# Patient Record
Sex: FEMALE | Race: WHITE | NOT HISPANIC OR LATINO | Employment: FULL TIME | URBAN - METROPOLITAN AREA
[De-identification: names, ages, dates, MRNs, and addresses within clinical notes are randomized per-mention and may not be internally consistent; named-entity substitution may affect disease eponyms.]

---
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
## 2020-02-02 DIAGNOSIS — N94.6 DYSMENORRHEA: ICD-10-CM

## 2020-02-02 DIAGNOSIS — L70.0 ACNE VULGARIS: ICD-10-CM

## 2020-02-03 RX ORDER — NORGESTIMATE AND ETHINYL ESTRADIOL 7DAYSX3 28
KIT ORAL
Qty: 84 TABLET | Refills: 0 | Status: SHIPPED | OUTPATIENT
Start: 2020-02-03 | End: 2020-04-21

## 2020-02-04 NOTE — TELEPHONE ENCOUNTER
Pending Prescriptions:                       Disp   Refills    TRI FEMYNOR 0.18/0.215/0.25 MG-35 MCG tab*84 tab*0            Sig: TAKE 1 TABLET BY MOUTH EVERY DAY    Medication is being filled for 1 time refill only due to:  Patient needs to be seen because it has been more than one year since last visit.     Krystin Arroyo, LOLAN, RN, PHN

## 2020-02-04 NOTE — TELEPHONE ENCOUNTER
Left message for patient to return call to clinic. When call is returned please assist in scheduling an appointment. See message below.    Edu Alex,

## 2020-02-05 NOTE — TELEPHONE ENCOUNTER
Left message for pt to return call, when call is returned give information below and help schedule visit.      Tricia Smith CMA ([facility])

## 2020-02-07 NOTE — TELEPHONE ENCOUNTER
No answer and unable to leave a message, voicemail is full. Pharmacy notified one time fill only patient is due for an office visit

## 2020-04-09 ENCOUNTER — MYC MEDICAL ADVICE (OUTPATIENT)
Dept: FAMILY MEDICINE | Facility: OTHER | Age: 20
End: 2020-04-09

## 2020-04-10 NOTE — TELEPHONE ENCOUNTER
MyChart message has been read, appt has not been read  Closing encounter  Gabrielle George RT (R)

## 2020-04-21 ENCOUNTER — VIRTUAL VISIT (OUTPATIENT)
Dept: FAMILY MEDICINE | Facility: OTHER | Age: 20
End: 2020-04-21
Payer: COMMERCIAL

## 2020-04-21 DIAGNOSIS — N94.6 DYSMENORRHEA: ICD-10-CM

## 2020-04-21 DIAGNOSIS — L70.0 ACNE VULGARIS: ICD-10-CM

## 2020-04-21 DIAGNOSIS — J02.9 SORE THROAT: Primary | ICD-10-CM

## 2020-04-21 PROCEDURE — 99214 OFFICE O/P EST MOD 30 MIN: CPT | Mod: GT | Performed by: PHYSICIAN ASSISTANT

## 2020-04-21 RX ORDER — PENICILLIN V POTASSIUM 500 MG/1
500 TABLET, FILM COATED ORAL 2 TIMES DAILY
Qty: 20 TABLET | Refills: 0 | Status: SHIPPED | OUTPATIENT
Start: 2020-04-21 | End: 2020-08-13

## 2020-04-21 RX ORDER — NORGESTIMATE AND ETHINYL ESTRADIOL 7DAYSX3 28
1 KIT ORAL DAILY
Qty: 84 TABLET | Refills: 3 | Status: SHIPPED | OUTPATIENT
Start: 2020-04-21 | End: 2021-04-01

## 2020-04-21 NOTE — PROGRESS NOTES
"Deepika Wheeler is a 19 year old female who is being evaluated via a billable video visit.      The patient has been notified of following:     \"This video visit will be conducted via a call between you and your physician/provider. We have found that certain health care needs can be provided without the need for an in-person physical exam.  This service lets us provide the care you need with a video conversation.  If a prescription is necessary we can send it directly to your pharmacy.  If lab work is needed we can place an order for that and you can then stop by our lab to have the test done at a later time.    Video visits are billed at different rates depending on your insurance coverage.  Please reach out to your insurance provider with any questions.    If during the course of the call the physician/provider feels a video visit is not appropriate, you will not be charged for this service.\"    Patient has given verbal consent for Video visit? Yes    How would you like to obtain your AVS? Edi    Patient would like the video invitation sent by: Send to e-mail at: [email or web address] [phone]    Will anyone else be joining your video visit? No      Subjective     Deepika Wheeler is a 19 year old female who presents to clinic today for the following health issues:    HPI  Acute Illness   Acute illness concerns: Throat pain  Onset: 2-3 days    Fever: no    Chills/Sweats: no    Headache (location?): YES    Sinus Pressure:YES- little    Conjunctivitis:  no    Ear Pain: no    Rhinorrhea: YES- little    Congestion: no    Sore Throat: YES     Cough: no    Wheeze: no    Decreased Appetite: no    Nausea: no    Vomiting: no    Diarrhea:  no    Dysuria/Freq.: no    Fatigue/Achiness: YES- fatigue    Sick/Strep Exposure: YES- brother had strep and she was around him.  Her brother had strep last week and she saw him.   Pt was treated for strep on 3-3-2020 with amox, did feel better after prescription  Was completed    "   Therapies Tried and outcome: Ibuprofen         Video Start Time: 13:36        Current Outpatient Medications   Medication Sig Dispense Refill     albuterol (PROAIR HFA) 108 (90 BASE) MCG/ACT inhaler Inhale 1-2 puffs into the lungs every 4 hours as needed for shortness of breath / dyspnea. 2 Inhaler prn     clindamycin (CLEOCIN T) 1 % external lotion APPLY TOPICALLY TWO TIMES A DAY 60 mL 11     IBUPROFEN PO Reported on 4/24/2017       loratadine (CLARITIN) 10 MG tablet Take 10 mg by mouth daily       norgestim-eth estrad triphasic (TRI FEMYNOR) 0.18/0.215/0.25 MG-35 MCG tablet Take 1 tablet by mouth daily 84 tablet 3     penicillin V (VEETID) 500 MG tablet Take 1 tablet (500 mg) by mouth 2 times daily 20 tablet 0     SUMAtriptan (IMITREX) 50 MG tablet TAKE ONE TABLET BY MOUTH AT ONSET OF HEADACHE FOR MIGRAINE. MAY REPEAT IN 2 HOURS IF NEEDED. MAXIMUM OF 2 TABLETS PER DAY 9 tablet 0     triamcinolone (NASACORT) 55 MCG/ACT nasal inhaler Spray 2 sprays into both nostrils 2 times daily Reported on 4/24/2017       BP Readings from Last 3 Encounters:   01/07/19 98/60   10/09/17 104/60   04/24/17 102/62 (17 %/ 26 %)*     *BP percentiles are based on the 2017 AAP Clinical Practice Guideline for girls    Wt Readings from Last 3 Encounters:   01/07/19 73.5 kg (162 lb) (90 %)*   10/09/17 68.6 kg (151 lb 3.2 oz) (86 %)*   05/18/17 71.1 kg (156 lb 11.2 oz) (90 %)*     * Growth percentiles are based on CDC (Girls, 2-20 Years) data.                    Reviewed and updated as needed this visit by Provider         Review of Systems   ROS COMP: CONSTITUTIONAL: NEGATIVE for fever, chills, change in weight  ENT/MOUTH: as above  RESP: NEGATIVE for significant cough or SOB  CV: NEGATIVE for chest pain, palpitations or peripheral edema  : she will need her bcp refilled too, doing well, no side effects or issues with this medication      Objective    There were no vitals taken for this visit.  Estimated body mass index is 24.64 kg/m  as  "calculated from the following:    Height as of 1/7/19: 1.727 m (5' 7.99\").    Weight as of 1/7/19: 73.5 kg (162 lb).  Physical Exam     GENERAL: healthy, alert and no distress  EYES: Eyes grossly normal to inspection, conjunctivae and sclerae normal  HENT: tonsillar hypertrophy and tonsillar erythema  No visible exudate   NECK: no asymmetry, masses or scars  RESP: no audible wheeze, cough, or visible cyanosis.  No visible retractions or increased work of breathing.  Able to speak fully in complete sentences.  SKIN: no suspicious lesions or rashes on visible skin  NEURO: Cranial nerves grossly intact, mentation intact and speech normal  PSYCH: mentation appears normal, affect normal/bright, judgement and insight intact, normal speech and appearance well-groomed      Diagnostic Test Results:  Labs reviewed in Epic  none         Assessment & Plan     1. Acne vulgaris  Pt requested refill, doing well PE in clinic as soon as we are able  - norgestim-eth estrad triphasic (TRI FEMYNOR) 0.18/0.215/0.25 MG-35 MCG tablet; Take 1 tablet by mouth daily  Dispense: 84 tablet; Refill: 3    2. Dysmenorrhea  Well controlled as above  - norgestim-eth estrad triphasic (TRI FEMYNOR) 0.18/0.215/0.25 MG-35 MCG tablet; Take 1 tablet by mouth daily  Dispense: 84 tablet; Refill: 3    3. Sore throat  Presumed strep vs virus, she will observe her symptoms for a few more days, if worsening and feels like strep she has had in past, ok to fill rx  - penicillin V (VEETID) 500 MG tablet; Take 1 tablet (500 mg) by mouth 2 times daily  Dispense: 20 tablet; Refill: 0           Return in about 3 months (around 7/21/2020) for Physical Exam.    Lena Madrid PA-C  [facility]      Video-Visit Details    Type of service:  Video Visit    Video End Time (time video stopped): 13:43    Originating Location (pt. Location): Home    Distant Location (provider location):  [facility]     Mode of Communication:  Video Conference " via doximity     Return in about 3 months (around 7/21/2020) for Physical Exam.       Lena Madrid PA-C

## 2020-05-19 ENCOUNTER — MYC MEDICAL ADVICE (OUTPATIENT)
Dept: FAMILY MEDICINE | Facility: OTHER | Age: 20
End: 2020-05-19

## 2020-05-19 DIAGNOSIS — Z11.59 SCREENING FOR VIRAL DISEASE: Primary | ICD-10-CM

## 2020-05-20 DIAGNOSIS — Z11.59 SCREENING FOR VIRAL DISEASE: ICD-10-CM

## 2020-05-20 PROCEDURE — 36415 COLL VENOUS BLD VENIPUNCTURE: CPT | Performed by: PHYSICIAN ASSISTANT

## 2020-05-20 PROCEDURE — 86769 SARS-COV-2 COVID-19 ANTIBODY: CPT | Mod: 90 | Performed by: PHYSICIAN ASSISTANT

## 2020-05-20 PROCEDURE — 99000 SPECIMEN HANDLING OFFICE-LAB: CPT | Performed by: PHYSICIAN ASSISTANT

## 2020-05-22 LAB
COVID-19 SPIKE RBD ABY TITER: NORMAL
COVID-19 SPIKE RBD ABY: NEGATIVE

## 2020-07-16 ENCOUNTER — MYC MEDICAL ADVICE (OUTPATIENT)
Dept: FAMILY MEDICINE | Facility: OTHER | Age: 20
End: 2020-07-16

## 2020-07-16 DIAGNOSIS — Z01.84 IMMUNITY STATUS TESTING: Primary | ICD-10-CM

## 2020-07-20 ENCOUNTER — ALLIED HEALTH/NURSE VISIT (OUTPATIENT)
Dept: FAMILY MEDICINE | Facility: CLINIC | Age: 20
End: 2020-07-20
Payer: COMMERCIAL

## 2020-07-20 DIAGNOSIS — Z01.84 IMMUNITY STATUS TESTING: ICD-10-CM

## 2020-07-20 DIAGNOSIS — Z11.1 TUBERCULOSIS SCREENING: Primary | ICD-10-CM

## 2020-07-20 DIAGNOSIS — Z23 NEED FOR VACCINATION: ICD-10-CM

## 2020-07-20 PROCEDURE — 99207 ZZC NO CHARGE NURSE ONLY: CPT

## 2020-07-20 PROCEDURE — 90471 IMMUNIZATION ADMIN: CPT

## 2020-07-20 PROCEDURE — 86580 TB INTRADERMAL TEST: CPT

## 2020-07-20 PROCEDURE — 86735 MUMPS ANTIBODY: CPT | Performed by: PHYSICIAN ASSISTANT

## 2020-07-20 PROCEDURE — 86765 RUBEOLA ANTIBODY: CPT | Performed by: PHYSICIAN ASSISTANT

## 2020-07-20 PROCEDURE — 86762 RUBELLA ANTIBODY: CPT | Performed by: PHYSICIAN ASSISTANT

## 2020-07-20 PROCEDURE — 36415 COLL VENOUS BLD VENIPUNCTURE: CPT | Performed by: PHYSICIAN ASSISTANT

## 2020-07-20 PROCEDURE — 90715 TDAP VACCINE 7 YRS/> IM: CPT

## 2020-07-20 NOTE — NURSING NOTE
Prior to immunization administration, verified patients identity using patient s name and date of birth. Please see Immunization Activity for additional information.     Screening Questionnaire for Adult Immunization    Are you sick today?   No   Do you have allergies to medications, food, a vaccine component or latex?   No   Have you ever had a serious reaction after receiving a vaccination?   No   Do you have a long-term health problem with heart, lung, kidney, or metabolic disease (e.g., diabetes), asthma, a blood disorder, no spleen, complement component deficiency, a cochlear implant, or a spinal fluid leak?  Are you on long-term aspirin therapy?   No   Do you have cancer, leukemia, HIV/AIDS, or any other immune system problem?   No   Do you have a parent, brother, or sister with an immune system problem?   No   In the past 3 months, have you taken medications that affect  your immune system, such as prednisone, other steroids, or anticancer drugs; drugs for the treatment of rheumatoid arthritis, Crohn s disease, or psoriasis; or have you had radiation treatments?   No   Have you had a seizure, or a brain or other nervous system problem?   No   During the past year, have you received a transfusion of blood or blood    products, or been given immune (gamma) globulin or antiviral drug?   No   For women: Are you pregnant or is there a chance you could become       pregnant during the next month?   No   Have you received any vaccinations in the past 4 weeks?   No     Immunization questionnaire answers were all negative.        . Patient instructed to remain in clinic for 15 minutes afterwards, and to report any adverse reaction to me immediately.       Screening performed by Courtney Melchor on 7/20/2020 at 9:12 AM      Patient was instructed to come back in 48-72 hours to get Mantoux checked.    The patient is asked the following questions today and these are her answers:    -Have you had a mantoux administered in  the past 30 days?    No  -Have you had a previous positive Mantoux.  No  -Have you received BCG in the past.  No  -Have you had a live vaccine  (MMR, Varicella, OPV, Yellow Fever) in the last 6 weeks.  No  -Have you had and active  viral or bacterial infection in the past 6 weeks.  No  -Have you received corticosteroids or immunosuppressive agents in the past 6 weeks.  No  -Have you been diagnosed with HIV?  No  -Do you have a malignancy?  No     All were answered on the Immunization questions as well.     Mantoux Questionnaire: answers were all negative.

## 2020-07-21 LAB
MEV IGG SER QL IA: 3.8 AI (ref 0–0.8)
MUV IGG SER QL IA: 3.3 AI (ref 0–0.8)
RUBV IGG SERPL IA-ACNC: 10 IU/ML

## 2020-07-22 ENCOUNTER — ALLIED HEALTH/NURSE VISIT (OUTPATIENT)
Dept: FAMILY MEDICINE | Facility: CLINIC | Age: 20
End: 2020-07-22
Payer: COMMERCIAL

## 2020-07-22 DIAGNOSIS — Z11.1 TUBERCULIN SKIN TEST ENCOUNTER: Primary | ICD-10-CM

## 2020-07-22 LAB
PPDINDURATION: 0 MM (ref 0–5)
PPDREDNESS: 0 MM

## 2020-07-22 PROCEDURE — 99207 ZZC NO CHARGE NURSE ONLY: CPT

## 2020-07-22 NOTE — PROGRESS NOTES
Mantoux result:  Lab Results   Component Value Date    PPDREDNESS 0 07/22/2020    PPDINDURATIO 0 07/22/2020     Is induration greater than 5mm?  No     Malissa Bella, LOLAN, RN

## 2020-08-06 ENCOUNTER — MYC MEDICAL ADVICE (OUTPATIENT)
Dept: FAMILY MEDICINE | Facility: OTHER | Age: 20
End: 2020-08-06

## 2020-08-12 NOTE — PROGRESS NOTES
SUBJECTIVE:   CC: Deepika Wheeler is an 20 year old woman who presents for preventive health visit.     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  She has already had her titers done.  Answers for HPI/ROS submitted by the patient on 8/13/2020   Annual Exam:  If you checked off any problems, how difficult have these problems made it for you to do your work, take care of things at home, or get along with other people?: Very difficult  PHQ9 TOTAL SCORE: 11      Today's PHQ-2 Score:   PHQ-2 ( 1999 Pf[employer]) 8/13/2020   Q1: Little interest or pleasure in doing things 1   Q2: Feeling down, depressed or hopeless 2   PHQ-2 Score 3   Q1: Little interest or pleasure in doing things Several days   Q2: Feeling down, depressed or hopeless More than half the days   PHQ-2 Score 3       Abuse: Current or Past(Physical, Sexual or Emotional)- No  Do you feel safe in your environment? Yes        Social History     Tobacco Use     Smoking status: Passive Smoke Exposure - Never Smoker     Smokeless tobacco: Never Used     Tobacco comment: no exposure   Substance Use Topics     Alcohol use: No     Alcohol/week: 0.0 standard drinks     If you drink alcohol do you typically have >3 drinks per day or >7 drinks per week? No    Alcohol Use 8/13/2020   Prescreen: >3 drinks/day or >7 drinks/week? No   Prescreen: >3 drinks/day or >7 drinks/week? -   No flowsheet data found.    Reviewed orders with patient.  Reviewed health maintenance and  updated orders accordingly - Yes  Labs reviewed in EPIC  BP Readings from Last 3 Encounters:   08/13/20 116/68   01/07/19 98/60   10/09/17 104/60    Wt Readings from Last 3 Encounters:   08/13/20 73.5 kg (162 lb)   01/07/19 73.5 kg (162 lb) (90 %, Z= 1.28)*   10/09/17 68.6 kg (151 lb 3.2 oz) (86 %, Z= 1.09)*     * Growth percentiles are based on [facility] (Girls, 2-20 Years) data.                  Patient Active Problem List   Diagnosis     Sinusitis, chronic     Allergic rhinitis     Headache     Acne     Adjustment disorder with mixed anxiety and depressed mood     Dysmenorrhea     CHEMO (generalized anxiety disorder)     Mild single current episode of major depressive disorder (H)     Acne vulgaris     Past Surgical History:   Procedure Laterality Date     HC CREATE EARDRUM OPENING,GEN ANESTH  2001    P.E. Tubes       Social History     Tobacco Use     Smoking status: Passive Smoke Exposure - Never Smoker     Smokeless tobacco: Never Used     Tobacco comment: no exposure   Substance Use Topics     Alcohol use: No     Alcohol/week: 0.0 standard drinks     Family History   Problem Relation Age of Onset     Alcohol/Drug Maternal Grandfather         recovering alcoholic     Blood Disease Maternal Grandfather         Hodgkins     Cancer Maternal Grandfather         non-hodgkins lymphoma     Heart Disease Maternal Grandfather         arrythmias     Hypertension Maternal Grandfather      Lipids Maternal Grandfather      Coronary Artery Disease Maternal Grandfather      Other Cancer Maternal Grandfather         Hodgkins     Hyperlipidemia Maternal Grandfather      Depression Maternal Grandfather      Substance Abuse Maternal Grandfather      Anesthesia Reaction Maternal Grandmother      Cancer Maternal Grandmother         Skin     Blood Disease Maternal Grandmother         Non-Hodgkins     Hypertension Maternal Grandmother      Lipids Maternal Grandmother      Obesity Maternal Grandmother      Cerebrovascular Disease Maternal  Grandmother      Other Cancer Maternal Grandmother         non hodgkins     Coronary Artery Disease Maternal Grandmother      Hyperlipidemia Maternal Grandmother      Depression Maternal Grandmother      Anxiety Disorder Maternal Grandmother      Heart Disease Brother         PDA & ASD     Congenital Anomalies Brother         Downs     Genetic Disorder Brother         down syndrome      () Other      Coronary Artery Disease Paternal Grandmother      Hypertension Paternal Grandmother      Osteoporosis Paternal Grandmother      Hyperlipidemia Paternal Grandmother      Coronary Artery Disease Paternal Grandfather      Hypertension Paternal Grandfather      Hyperlipidemia Paternal Grandfather      Substance Abuse Paternal Grandfather      Hypertension Mother      Depression Mother      Asthma Mother      Depression Father      Anxiety Disorder Father      Obesity Brother      Anxiety Disorder Brother          Current Outpatient Medications   Medication Sig Dispense Refill     albuterol (PROAIR HFA) 108 (90 BASE) MCG/ACT inhaler Inhale 1-2 puffs into the lungs every 4 hours as needed for shortness of breath / dyspnea. 2 Inhaler prn     clindamycin (CLEOCIN T) 1 % external lotion APPLY TOPICALLY TWO TIMES A DAY 60 mL 11     clindamycin (CLINDAMAX) 1 % external gel Apply topically 2 times daily 60 g 11     IBUPROFEN PO Reported on 4/24/2017       loratadine (CLARITIN) 10 MG tablet Take 10 mg by mouth daily       norgestim-eth estrad triphasic (TRI FEMYNOR) 0.18/0.215/0.25 MG-35 MCG tablet Take 1 tablet by mouth daily 84 tablet 3     SUMAtriptan (IMITREX) 50 MG tablet TAKE ONE TABLET BY MOUTH AT ONSET OF HEADACHE FOR MIGRAINE. MAY REPEAT IN 2 HOURS IF NEEDED. MAXIMUM OF 2 TABLETS PER DAY 9 tablet 0     triamcinolone (NASACORT) 55 MCG/ACT nasal inhaler Spray 2 sprays into both nostrils 2 times daily Reported on 4/24/2017       Allergies   Allergen Reactions     Blueberry Flavor Hives     Griseofulvin      rash      "Penicillins      augmentin     Quinolones      floxin  rash       Mammogram not appropriate for this patient based on age.    Pertinent mammograms are reviewed under the imaging tab.  History of abnormal Pap smear: NO - under age 21, PAP not appropriate for age     Reviewed and updated as needed this visit by clinical staff  Tobacco  Allergies  Meds  Med Hx  Surg Hx  Fam Hx  Soc Hx        Reviewed and updated as needed this visit by Provider            Review of Systems   Constitutional: Negative for chills and fever.   HENT: Negative for congestion, ear pain, hearing loss and sore throat.    Eyes: Negative for pain and visual disturbance.   Respiratory: Negative for cough and shortness of breath.    Cardiovascular: Negative for chest pain, palpitations and peripheral edema.   Gastrointestinal: Negative for abdominal pain, constipation, diarrhea, heartburn, hematochezia and nausea.   Breasts:  Negative for tenderness, breast mass and discharge.   Genitourinary: Negative for dysuria, frequency, genital sores, hematuria, urgency, vaginal bleeding and vaginal discharge.   Musculoskeletal: Negative for arthralgias, joint swelling and myalgias.   Skin: Negative for rash.   Neurological: Negative for dizziness, headaches and paresthesias.   Psychiatric/Behavioral: Positive for mood changes. The patient is nervous/anxious.    All other systems reviewed and are negative.    Anxiety she is managing it on her own and doing fine with it.       OBJECTIVE:   /68 (BP Location: Right arm, Patient Position: Chair, Cuff Size: Adult Regular)   Pulse 98   Temp 97.8  F (36.6  C) (Temporal)   Resp 16   Ht 1.744 m (5' 8.66\")   Wt 73.5 kg (162 lb)   LMP 08/05/2020   SpO2 100%   BMI 24.16 kg/m    Physical Exam  GENERAL: healthy, alert and no distress  EYES: Eyes grossly normal to inspection, PERRL and conjunctivae and sclerae normal  HENT: ear canals and TM's normal, nose and mouth without ulcers or lesions  NECK: no " "adenopathy, no asymmetry, masses, or scars and thyroid normal to palpation  RESP: lungs clear to auscultation - no rales, rhonchi or wheezes  BREAST: normal without masses, tenderness or nipple discharge and no palpable axillary masses or adenopathy  CV: regular rate and rhythm, normal S1 S2, no S3 or S4, no murmur, click or rub, no peripheral edema and peripheral pulses strong  ABDOMEN: soft, nontender, no hepatosplenomegaly, no masses and bowel sounds normal  MS: no gross musculoskeletal defects noted, no edema  SKIN: no suspicious lesions or rashes  NEURO: Normal strength and tone, mentation intact and speech normal  PSYCH: mentation appears normal, affect normal/bright    Diagnostic Test Results:  Labs reviewed in Epic  none     ASSESSMENT/PLAN:   1. Routine general medical examination at a health care facility  Healthy female    2. Acne vulgaris  Requests gel instead of lotion  - clindamycin (CLINDAMAX) 1 % external gel; Apply topically 2 times daily  Dispense: 60 g; Refill: 11    3. Screening examination for pulmonary tuberculosis  pendning  - Quantiferon TB Gold Plus    COUNSELING:  Reviewed preventive health counseling, as reflected in patient instructions    Estimated body mass index is 24.16 kg/m  as calculated from the following:    Height as of this encounter: 1.744 m (5' 8.66\").    Weight as of this encounter: 73.5 kg (162 lb).         reports that she is a non-smoker but has been exposed to tobacco smoke. She has never used smokeless tobacco.      Counseling Resources:  ATP IV Guidelines  Pooled Cohorts Equation Calculator  Breast Cancer Risk Calculator  FRAX Risk Assessment  ICSI Preventive Guidelines  Dietary Guidelines for Americans, 2010  USDA's MyPlate  ASA Prophylaxis  Lung CA Screening    Lena Madrid PA-C  [facility]  "

## 2020-08-13 ENCOUNTER — OFFICE VISIT (OUTPATIENT)
Dept: FAMILY MEDICINE | Facility: OTHER | Age: 20
End: 2020-08-13
Payer: COMMERCIAL

## 2020-08-13 VITALS
TEMPERATURE: 97.8 F | HEIGHT: 69 IN | OXYGEN SATURATION: 100 % | WEIGHT: 162 LBS | SYSTOLIC BLOOD PRESSURE: 116 MMHG | BODY MASS INDEX: 23.99 KG/M2 | DIASTOLIC BLOOD PRESSURE: 68 MMHG | HEART RATE: 98 BPM | RESPIRATION RATE: 16 BRPM

## 2020-08-13 DIAGNOSIS — Z00.00 ROUTINE GENERAL MEDICAL EXAMINATION AT A HEALTH CARE FACILITY: Primary | ICD-10-CM

## 2020-08-13 DIAGNOSIS — Z11.1 SCREENING EXAMINATION FOR PULMONARY TUBERCULOSIS: ICD-10-CM

## 2020-08-13 DIAGNOSIS — L70.0 ACNE VULGARIS: ICD-10-CM

## 2020-08-13 PROCEDURE — 36415 COLL VENOUS BLD VENIPUNCTURE: CPT | Performed by: PHYSICIAN ASSISTANT

## 2020-08-13 PROCEDURE — 99395 PREV VISIT EST AGE 18-39: CPT | Performed by: PHYSICIAN ASSISTANT

## 2020-08-13 PROCEDURE — 86481 TB AG RESPONSE T-CELL SUSP: CPT | Performed by: PHYSICIAN ASSISTANT

## 2020-08-13 RX ORDER — CLINDAMYCIN PHOSPHATE 10 MG/G
GEL TOPICAL 2 TIMES DAILY
Qty: 60 G | Refills: 11 | Status: SHIPPED | OUTPATIENT
Start: 2020-08-13 | End: 2021-09-14

## 2020-08-13 ASSESSMENT — MIFFLIN-ST. JEOR: SCORE: 1563.82

## 2020-08-13 ASSESSMENT — ENCOUNTER SYMPTOMS
JOINT SWELLING: 0
FREQUENCY: 0
DIZZINESS: 0
SORE THROAT: 0
COUGH: 0
CHILLS: 0
PARESTHESIAS: 0
DIARRHEA: 0
FEVER: 0
SHORTNESS OF BREATH: 0
CONSTIPATION: 0
NAUSEA: 0
ABDOMINAL PAIN: 0
NERVOUS/ANXIOUS: 1
HEADACHES: 0
MYALGIAS: 0
HEMATOCHEZIA: 0
ARTHRALGIAS: 0
HEMATURIA: 0
DYSURIA: 0
BREAST MASS: 0
PALPITATIONS: 0
EYE PAIN: 0
HEARTBURN: 0

## 2020-08-13 ASSESSMENT — PATIENT HEALTH QUESTIONNAIRE - PHQ9
SUM OF ALL RESPONSES TO PHQ QUESTIONS 1-9: 11
10. IF YOU CHECKED OFF ANY PROBLEMS, HOW DIFFICULT HAVE THESE PROBLEMS MADE IT FOR YOU TO DO YOUR WORK, TAKE CARE OF THINGS AT HOME, OR GET ALONG WITH OTHER PEOPLE: VERY DIFFICULT
SUM OF ALL RESPONSES TO PHQ QUESTIONS 1-9: 11

## 2020-08-13 ASSESSMENT — PAIN SCALES - GENERAL: PAINLEVEL: NO PAIN (0)

## 2020-08-14 ASSESSMENT — PATIENT HEALTH QUESTIONNAIRE - PHQ9: SUM OF ALL RESPONSES TO PHQ QUESTIONS 1-9: 11

## 2020-08-17 ENCOUNTER — MYC MEDICAL ADVICE (OUTPATIENT)
Dept: FAMILY MEDICINE | Facility: OTHER | Age: 20
End: 2020-08-17

## 2020-08-17 DIAGNOSIS — Z20.818 EXPOSURE TO STREPTOCOCCAL PHARYNGITIS: Primary | ICD-10-CM

## 2020-08-17 LAB
GAMMA INTERFERON BACKGROUND BLD IA-ACNC: 0.16 IU/ML
M TB IFN-G CD4+ BCKGRND COR BLD-ACNC: 9.84 IU/ML
M TB TUBERC IFN-G BLD QL: NEGATIVE
MITOGEN IGNF BCKGRD COR BLD-ACNC: 0.05 IU/ML
MITOGEN IGNF BCKGRD COR BLD-ACNC: 0.14 IU/ML

## 2020-08-18 ENCOUNTER — ALLIED HEALTH/NURSE VISIT (OUTPATIENT)
Dept: FAMILY MEDICINE | Facility: OTHER | Age: 20
End: 2020-08-18
Payer: COMMERCIAL

## 2020-08-18 DIAGNOSIS — Z20.818 EXPOSURE TO STREPTOCOCCAL PHARYNGITIS: ICD-10-CM

## 2020-08-18 LAB
DEPRECATED S PYO AG THROAT QL EIA: NEGATIVE
SPECIMEN SOURCE: NORMAL
SPECIMEN SOURCE: NORMAL
STREP GROUP A PCR: NOT DETECTED

## 2020-08-18 PROCEDURE — 99207 ZZC NO CHARGE NURSE ONLY: CPT

## 2020-08-18 PROCEDURE — 87651 STREP A DNA AMP PROBE: CPT | Performed by: PHYSICIAN ASSISTANT

## 2020-08-18 PROCEDURE — 40001204 ZZHCL STATISTIC STREP A RAPID: Performed by: PHYSICIAN ASSISTANT

## 2020-08-18 NOTE — PROGRESS NOTES
Patient consents to receive outdoor care: Yes    Upon arrival, patient instructed to proceed to designated location, place vehicle in park, turn off, and remove keys     If we are unable to safely and ergonomically able to provide care- is the patient able to safely able to get out of car and transfer to a chair? Yes    Patient would like to receive their AVS via MyChart.    Throat swab obtained and given to lab.     Miryam Robbins MA

## 2020-08-28 ENCOUNTER — MYC MEDICAL ADVICE (OUTPATIENT)
Dept: FAMILY MEDICINE | Facility: OTHER | Age: 20
End: 2020-08-28

## 2021-03-04 ENCOUNTER — MYC REFILL (OUTPATIENT)
Dept: FAMILY MEDICINE | Facility: OTHER | Age: 21
End: 2021-03-04

## 2021-03-04 DIAGNOSIS — R51.9 NONINTRACTABLE EPISODIC HEADACHE, UNSPECIFIED HEADACHE TYPE: ICD-10-CM

## 2021-03-04 RX ORDER — SUMATRIPTAN 50 MG/1
TABLET, FILM COATED ORAL
Qty: 9 TABLET | Refills: 1 | Status: SHIPPED | OUTPATIENT
Start: 2021-03-04 | End: 2024-02-12

## 2021-04-01 ENCOUNTER — MYC MEDICAL ADVICE (OUTPATIENT)
Dept: FAMILY MEDICINE | Facility: OTHER | Age: 21
End: 2021-04-01

## 2021-04-01 DIAGNOSIS — L70.0 ACNE VULGARIS: ICD-10-CM

## 2021-04-01 DIAGNOSIS — N94.6 DYSMENORRHEA: ICD-10-CM

## 2021-04-01 RX ORDER — NORGESTIMATE AND ETHINYL ESTRADIOL 7DAYSX3 28
KIT ORAL
Qty: 84 TABLET | Refills: 3 | Status: SHIPPED | OUTPATIENT
Start: 2021-04-01 | End: 2022-01-26

## 2021-04-01 RX ORDER — NORGESTIMATE AND ETHINYL ESTRADIOL 7DAYSX3 28
1 KIT ORAL DAILY
Qty: 84 TABLET | Refills: 3 | OUTPATIENT
Start: 2021-04-01

## 2021-04-01 NOTE — TELEPHONE ENCOUNTER
Rx filled per protocol. Patient notified by FTL SOLARhart.     OJ Gipson/[facility]

## 2021-07-06 ENCOUNTER — OFFICE VISIT (OUTPATIENT)
Dept: FAMILY MEDICINE | Facility: CLINIC | Age: 21
End: 2021-07-06
Payer: COMMERCIAL

## 2021-07-06 ENCOUNTER — APPOINTMENT (OUTPATIENT)
Dept: URGENT CARE | Facility: CLINIC | Age: 21
End: 2021-07-06
Payer: COMMERCIAL

## 2021-07-06 ENCOUNTER — MYC MEDICAL ADVICE (OUTPATIENT)
Dept: FAMILY MEDICINE | Facility: OTHER | Age: 21
End: 2021-07-06

## 2021-07-06 VITALS
TEMPERATURE: 97.3 F | WEIGHT: 164 LBS | SYSTOLIC BLOOD PRESSURE: 106 MMHG | HEIGHT: 69 IN | RESPIRATION RATE: 12 BRPM | DIASTOLIC BLOOD PRESSURE: 60 MMHG | HEART RATE: 80 BPM | BODY MASS INDEX: 24.29 KG/M2

## 2021-07-06 DIAGNOSIS — R82.90 NONSPECIFIC FINDING ON EXAMINATION OF URINE: ICD-10-CM

## 2021-07-06 DIAGNOSIS — R30.0 DYSURIA: ICD-10-CM

## 2021-07-06 DIAGNOSIS — N30.01 ACUTE CYSTITIS WITH HEMATURIA: ICD-10-CM

## 2021-07-06 LAB
ALBUMIN UR-MCNC: >=300 MG/DL
APPEARANCE UR: ABNORMAL
BACTERIA #/AREA URNS HPF: ABNORMAL /HPF
BILIRUB UR QL STRIP: ABNORMAL
COLOR UR AUTO: YELLOW
GLUCOSE UR STRIP-MCNC: NEGATIVE MG/DL
HGB UR QL STRIP: ABNORMAL
KETONES UR STRIP-MCNC: ABNORMAL MG/DL
LEUKOCYTE ESTERASE UR QL STRIP: ABNORMAL
NITRATE UR QL: POSITIVE
NON-SQ EPI CELLS #/AREA URNS LPF: ABNORMAL /LPF
PH UR STRIP: 6 PH (ref 5–7)
RBC #/AREA URNS AUTO: ABNORMAL /HPF
SOURCE: ABNORMAL
SP GR UR STRIP: >1.03 (ref 1–1.03)
UROBILINOGEN UR STRIP-ACNC: 0.2 EU/DL (ref 0.2–1)
WBC #/AREA URNS AUTO: ABNORMAL /HPF

## 2021-07-06 PROCEDURE — 87088 URINE BACTERIA CULTURE: CPT | Performed by: FAMILY MEDICINE

## 2021-07-06 PROCEDURE — 87086 URINE CULTURE/COLONY COUNT: CPT | Performed by: FAMILY MEDICINE

## 2021-07-06 PROCEDURE — 99213 OFFICE O/P EST LOW 20 MIN: CPT | Performed by: FAMILY MEDICINE

## 2021-07-06 PROCEDURE — 87186 SC STD MICRODIL/AGAR DIL: CPT | Performed by: FAMILY MEDICINE

## 2021-07-06 PROCEDURE — 81001 URINALYSIS AUTO W/SCOPE: CPT | Performed by: FAMILY MEDICINE

## 2021-07-06 RX ORDER — NITROFURANTOIN 25; 75 MG/1; MG/1
100 CAPSULE ORAL 2 TIMES DAILY
Qty: 10 CAPSULE | Refills: 0 | Status: SHIPPED | OUTPATIENT
Start: 2021-07-06 | End: 2021-07-11

## 2021-07-06 RX ORDER — PHENAZOPYRIDINE HYDROCHLORIDE 200 MG/1
200 TABLET, FILM COATED ORAL 3 TIMES DAILY PRN
Qty: 6 TABLET | Refills: 0 | Status: SHIPPED | OUTPATIENT
Start: 2021-07-06 | End: 2021-07-08

## 2021-07-06 ASSESSMENT — MIFFLIN-ST. JEOR: SCORE: 1565.34

## 2021-07-06 ASSESSMENT — PATIENT HEALTH QUESTIONNAIRE - PHQ9: SUM OF ALL RESPONSES TO PHQ QUESTIONS 1-9: 0

## 2021-07-06 NOTE — TELEPHONE ENCOUNTER
Visit advised.  Patient transferred to scheduling to make an appointment.    Gabrielle Johnson RN on 7/6/2021 at 8:39 AM

## 2021-07-06 NOTE — PROGRESS NOTES
Assessment & Plan   See after visit summary for helpful information and advice given to patient.    Acute cystitis with hematuria    - nitroFURantoin macrocrystal-monohydrate (MACROBID) 100 MG capsule  Dispense: 10 capsule; Refill: 0    Dysuria    - *UA reflex to Microscopic and Culture ([place] and [facility] (except [place] and [place])  - Urine Microscopic  - Urine Culture Aerobic Bacterial  - phenazopyridine (PYRIDIUM) 200 MG tablet  Dispense: 6 tablet; Refill: 0    Nonspecific finding on examination of urine    - Urine Culture Aerobic Bacterial        20 minutes spent on the date of the encounter doing chart review, history and exam, documentation and further activities per the note           Return in about 3 days (around 7/9/2021), or if symptoms worsen or fail to improve.    Milo Milligan DO  [facility]    Autumn Poe is a 21 year old who presents for the following health issues     HPI     Genitourinary - Female  Onset/Duration: 1 day  Description:   Painful urination (Dysuria): YES           Frequency: YES  Blood in urine (Hematuria): YES  Delay in urine (Hesitency): no  Intensity: moderate  Progression of Symptoms:  same  Accompanying Signs & Symptoms:  Fever/chills: no  Flank pain: YES  Nausea and vomiting: no  Vaginal symptoms: none  Abdominal/Pelvic Pain: YES  History:   History of frequent UTI s: no  History of kidney stones: no  Sexually Active: YES  Possibility of pregnancy: No  Precipitating or alleviating factors: None  Therapies tried and outcome: Cranberry juice prn (contraindicated in Coumadin patients) and Increase fluid intake        Review of Systems   Check in questions and answers reviewed. Patient is seen for chief concern of possible urinary tract infection.    Symptoms started yesterday.     No current concerns of a possible STD.     Patient has bilateral lower back discomfort.  No mid back flank area discomfort at time of exam.       "  Objective    /60 (Cuff Size: Adult Regular)   Pulse 80   Temp 97.3  F (36.3  C) (Tympanic)   Resp 12   Ht 1.74 m (5' 8.5\")   Wt 74.4 kg (164 lb)   LMP 06/22/2021   BMI 24.57 kg/m    Body mass index is 24.57 kg/m .  Physical Exam   Vital signs reviewed.  Patient is in no acute appearing distress.  Breathing appears nonlabored.  Patient is alert and oriented ×3.  Patient is very pleasant, making good eye contact and responding with clear fluent speech.    Back exam: No flank tenderness.    Results for orders placed or performed in visit on 07/06/21   *UA reflex to Microscopic and Culture ([place] and [facility] (except [place] and [place])     Status: Abnormal    Specimen: Midstream Urine   Result Value Ref Range    Color Urine Yellow     Appearance Urine Cloudy     Glucose Urine Negative NEG^Negative mg/dL    Bilirubin Urine Small (A) NEG^Negative    Ketones Urine Trace (A) NEG^Negative mg/dL    Specific Gravity Urine >1.030 1.003 - 1.035    Blood Urine Large (A) NEG^Negative    pH Urine 6.0 5.0 - 7.0 pH    Protein Albumin Urine >=300 (A) NEG^Negative mg/dL    Urobilinogen Urine 0.2 0.2 - 1.0 EU/dL    Nitrite Urine Positive (A) NEG^Negative    Leukocyte Esterase Urine Small (A) NEG^Negative    Source Midstream Urine    Urine Microscopic     Status: Abnormal   Result Value Ref Range    WBC Urine 25-50 (A) OTO5^0 - 5 /HPF    RBC Urine  (A) OTO2^O - 2 /HPF    Squamous Epithelial /LPF Urine Few FEW^Few /LPF    Bacteria Urine Moderate (A) NEG^Negative /HPF   Urine Culture Aerobic Bacterial     Status: Abnormal    Specimen: Midstream Urine   Result Value Ref Range    Specimen Description Midstream Urine     Special Requests Specimen received in preservative     Culture Micro 50,000 to 100,000 colonies/mL  Escherichia coli   (A)        Susceptibility    Escherichia coli - SILVANA     AMPICILLIN <=2 Sensitive ug/mL     CEFAZOLIN* <=4 Sensitive ug/mL      * Cefazolin SILVANA breakpoints are for the " treatment of uncomplicated urinary tract infections.  For the treatment of systemic infections, please contact the laboratory for additional testing.     CEFOXITIN <=4 Sensitive ug/mL     CEFTAZIDIME <=1 Sensitive ug/mL     CEFTRIAXONE <=1 Sensitive ug/mL     CIPROFLOXACIN 0.5 Intermediate ug/mL     GENTAMICIN <=1 Sensitive ug/mL     LEVOFLOXACIN 1 Intermediate ug/mL     NITROFURANTOIN <=16 Sensitive ug/mL     TOBRAMYCIN <=1 Sensitive ug/mL     Trimethoprim/Sulfa <=1/19 Sensitive ug/mL     AMPICILLIN/SULBACTAM <=2 Sensitive ug/mL     Piperacillin/Tazo <=4 Sensitive ug/mL     CEFEPIME <=1 Sensitive ug/mL   Urinalysis results reviewed with patient at time of exam which were suggestive of a urinary tract infection.

## 2021-07-06 NOTE — PATIENT INSTRUCTIONS

## 2021-07-07 LAB
BACTERIA SPEC CULT: ABNORMAL
Lab: ABNORMAL
SPECIMEN SOURCE: ABNORMAL

## 2021-07-10 ENCOUNTER — MYC MEDICAL ADVICE (OUTPATIENT)
Dept: FAMILY MEDICINE | Facility: OTHER | Age: 21
End: 2021-07-10

## 2021-07-12 ENCOUNTER — VIRTUAL VISIT (OUTPATIENT)
Dept: FAMILY MEDICINE | Facility: OTHER | Age: 21
End: 2021-07-12
Payer: COMMERCIAL

## 2021-07-12 DIAGNOSIS — Z12.4 SCREENING FOR MALIGNANT NEOPLASM OF CERVIX: ICD-10-CM

## 2021-07-12 DIAGNOSIS — J00 ACUTE RHINITIS: ICD-10-CM

## 2021-07-12 DIAGNOSIS — J01.90 ACUTE NON-RECURRENT SINUSITIS, UNSPECIFIED LOCATION: Primary | ICD-10-CM

## 2021-07-12 DIAGNOSIS — R51.9 NONINTRACTABLE EPISODIC HEADACHE, UNSPECIFIED HEADACHE TYPE: ICD-10-CM

## 2021-07-12 DIAGNOSIS — B37.31 YEAST INFECTION OF THE VAGINA: ICD-10-CM

## 2021-07-12 DIAGNOSIS — Z11.59 NEED FOR HEPATITIS C SCREENING TEST: ICD-10-CM

## 2021-07-12 DIAGNOSIS — Z11.3 SCREEN FOR STD (SEXUALLY TRANSMITTED DISEASE): ICD-10-CM

## 2021-07-12 PROCEDURE — 99213 OFFICE O/P EST LOW 20 MIN: CPT | Mod: 95 | Performed by: PHYSICIAN ASSISTANT

## 2021-07-12 RX ORDER — FLUCONAZOLE 150 MG/1
150 TABLET ORAL ONCE
Qty: 1 TABLET | Refills: 0 | Status: CANCELLED | OUTPATIENT
Start: 2021-07-12 | End: 2021-07-12

## 2021-07-12 RX ORDER — CEFPROZIL 500 MG/1
500 TABLET, FILM COATED ORAL 2 TIMES DAILY
Qty: 20 TABLET | Refills: 0 | Status: SHIPPED | OUTPATIENT
Start: 2021-07-12 | End: 2021-10-18

## 2021-07-12 RX ORDER — FLUCONAZOLE 150 MG/1
150 TABLET ORAL ONCE
Qty: 1 TABLET | Refills: 0 | Status: SHIPPED | OUTPATIENT
Start: 2021-07-12 | End: 2021-07-12

## 2021-07-12 NOTE — PROGRESS NOTES
Deepika is a 21 year old who is being evaluated via a billable video visit.      How would you like to obtain your AVS? MyChart  If the video visit is dropped, the invitation should be resent by: Text to cell phone: [phone]  Will anyone else be joining your video visit? No      Video Start Time: 2:42 PM    Assessment & Plan     Acute non-recurrent sinusitis, unspecified location  Over-the-counter meds as needed for symptoms, discussed low risk of cross reactivity with Augmentin she will be aware of symptoms and let me know at once if she develops M  - cefPROZIL (CEFZIL) 500 MG tablet; Take 1 tablet (500 mg) by mouth 2 times daily    Acute rhinitis  BS patient works in healthcare, I recommend doing a Covid swab, this was ordered for her  - Symptomatic COVID-19 Virus (Coronavirus) by PCR Nasopharyngeal; Future    Nonintractable episodic headache, unspecified headache type  As above  - Symptomatic COVID-19 Virus (Coronavirus) by PCR Nasopharyngeal; Future    Yeast infection of the vagina  Patient requested refill since she will be on antibiotic  - fluconazole (DIFLUCAN) 150 MG tablet; Take 1 tablet (150 mg) by mouth once for 1 dose        Screen for STD (sexually transmitted disease)  Patient is willing to do the screening she will make a  lab appointment for this  - NEISSERIA GONORRHOEA PCR; Future  - CHLAMYDIA TRACHOMATIS PCR; Future    Need for hepatitis C screening test  Patient declined                   No follow-ups on file.    Lena Madrid PA-C  [facility]    Autumn Poe is a 21 year old who presents for the following health issues     HPI     Acute Illness  Acute illness concerns:   Onset/Duration: 2 weeks  Symptoms:  Fever: no  Chills/Sweats: no  Headache (location?): YES, across your forehead  Sinus Pressure: YES, forehead  Conjunctivitis:  no  Ear Pain: no  Rhinorrhea: YES, green  Congestion: YES  Sore Throat: YES, from drainage, worse in morning, it comes and  goes  Cough: YES  Wheeze: no  Decreased Appetite: no  Nausea: no  Vomiting: no  Diarrhea: no  Dysuria/Freq.: no  Dysuria or Hematuria: no  Fatigue/Achiness: YES  Sick/Strep Exposure: no  Therapies tried and outcome: None  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  She did have Covid at the end of September early October and states she does not feel like she has Covid but would be comfortable doing another swab due to her profession    Review of Systems   As documented above       Objective           Vitals:  No vitals were obtained today due to virtual visit.    Physical Exam   GENERAL: Healthy, alert and no distress  EYES: Eyes grossly normal to inspection.  No discharge or erythema, or obvious scleral/conjunctival abnormalities.  RESP: No audible wheeze, cough, or visible cyanosis.  No visible retractions or increased work of breathing.    SKIN: Visible skin clear. No significant rash, abnormal pigmentation or lesions.  NEURO: Cranial nerves grossly intact.  Mentation and speech appropriate for age.  PSYCH: Mentation appears normal, affect normal/bright, judgement and insight intact, normal speech and appearance well-groomed.                Video-Visit Details    Type of service:  Video Visit    Video End Time:2:54 PM    Originating Location (pt. Location): Home    Distant Location (provider location):  [facility]     Platform used for Video Visit: [employer]

## 2021-07-26 ENCOUNTER — LAB (OUTPATIENT)
Dept: LAB | Facility: CLINIC | Age: 21
End: 2021-07-26
Payer: COMMERCIAL

## 2021-07-26 DIAGNOSIS — Z11.1 SCREENING EXAMINATION FOR PULMONARY TUBERCULOSIS: ICD-10-CM

## 2021-07-26 DIAGNOSIS — Z11.3 SCREEN FOR STD (SEXUALLY TRANSMITTED DISEASE): ICD-10-CM

## 2021-07-26 PROCEDURE — 87491 CHLMYD TRACH DNA AMP PROBE: CPT

## 2021-07-26 PROCEDURE — 36415 COLL VENOUS BLD VENIPUNCTURE: CPT

## 2021-07-26 PROCEDURE — 87591 N.GONORRHOEAE DNA AMP PROB: CPT

## 2021-07-26 PROCEDURE — 86481 TB AG RESPONSE T-CELL SUSP: CPT

## 2021-07-28 LAB
C TRACH DNA SPEC QL NAA+PROBE: NEGATIVE
N GONORRHOEA DNA SPEC QL NAA+PROBE: NEGATIVE

## 2021-07-29 LAB
QUANTIFERON MITOGEN: 10 IU/ML
QUANTIFERON NIL TUBE: 0.2 IU/ML
QUANTIFERON TB1 TUBE: 0.41 IU/ML
QUANTIFERON TB2 TUBE: 0.44

## 2021-07-30 LAB
GAMMA INTERFERON BACKGROUND BLD IA-ACNC: 0.2 IU/ML
M TB IFN-G BLD-IMP: NEGATIVE
M TB IFN-G CD4+ BCKGRND COR BLD-ACNC: 9.8 IU/ML
MITOGEN IGNF BCKGRD COR BLD-ACNC: 0.21 IU/ML
MITOGEN IGNF BCKGRD COR BLD-ACNC: 0.24 IU/ML

## 2021-08-11 ENCOUNTER — IMMUNIZATION (OUTPATIENT)
Dept: NURSING | Facility: CLINIC | Age: 21
End: 2021-08-11
Payer: COMMERCIAL

## 2021-08-11 PROCEDURE — 0001A PR COVID VAC PFIZER DIL RECON 30 MCG/0.3 ML IM: CPT

## 2021-08-11 PROCEDURE — 91300 PR COVID VAC PFIZER DIL RECON 30 MCG/0.3 ML IM: CPT

## 2021-08-28 ENCOUNTER — HEALTH MAINTENANCE LETTER (OUTPATIENT)
Age: 21
End: 2021-08-28

## 2021-09-03 ENCOUNTER — IMMUNIZATION (OUTPATIENT)
Dept: NURSING | Facility: CLINIC | Age: 21
End: 2021-09-03
Attending: INTERNAL MEDICINE
Payer: COMMERCIAL

## 2021-09-03 PROCEDURE — 0002A PR COVID VAC PFIZER DIL RECON 30 MCG/0.3 ML IM: CPT

## 2021-09-03 PROCEDURE — 91300 PR COVID VAC PFIZER DIL RECON 30 MCG/0.3 ML IM: CPT

## 2021-09-13 DIAGNOSIS — L70.0 ACNE VULGARIS: ICD-10-CM

## 2021-09-14 RX ORDER — CLINDAMYCIN PHOSPHATE 10 MG/G
GEL TOPICAL
Qty: 60 G | Refills: 10 | Status: SHIPPED | OUTPATIENT
Start: 2021-09-14 | End: 2024-02-12

## 2021-09-16 ENCOUNTER — MYC MEDICAL ADVICE (OUTPATIENT)
Dept: FAMILY MEDICINE | Facility: OTHER | Age: 21
End: 2021-09-16

## 2021-09-29 ENCOUNTER — TELEPHONE (OUTPATIENT)
Dept: FAMILY MEDICINE | Facility: OTHER | Age: 21
End: 2021-09-29
Payer: COMMERCIAL

## 2021-09-29 NOTE — TELEPHONE ENCOUNTER
Patient Quality Outreach Summary      Summary:    Patient is due/failing the following:   Cervical Cancer Screening - PAP Needed and Physical     Type of outreach:    Phone, left message for patient/parent to call back.    Questions for provider review:    None                                                                                                                    Sonja Olivia CMA       Chart routed to Care Team.

## 2021-10-18 ENCOUNTER — VIRTUAL VISIT (OUTPATIENT)
Dept: FAMILY MEDICINE | Facility: OTHER | Age: 21
End: 2021-10-18
Payer: COMMERCIAL

## 2021-10-18 DIAGNOSIS — F43.23 ADJUSTMENT DISORDER WITH MIXED ANXIETY AND DEPRESSED MOOD: Primary | ICD-10-CM

## 2021-10-18 DIAGNOSIS — J30.2 SEASONAL ALLERGIC RHINITIS, UNSPECIFIED TRIGGER: ICD-10-CM

## 2021-10-18 DIAGNOSIS — F32.0 MILD SINGLE CURRENT EPISODE OF MAJOR DEPRESSIVE DISORDER (H): ICD-10-CM

## 2021-10-18 PROCEDURE — 99213 OFFICE O/P EST LOW 20 MIN: CPT | Mod: 95 | Performed by: PHYSICIAN ASSISTANT

## 2021-10-18 RX ORDER — ESCITALOPRAM OXALATE 10 MG/1
10 TABLET ORAL DAILY
Qty: 30 TABLET | Refills: 1 | Status: SHIPPED | OUTPATIENT
Start: 2021-10-18 | End: 2021-11-12

## 2021-10-18 NOTE — PROGRESS NOTES
Deepika is a 21 year old who is being evaluated via a billable video visit.      How would you like to obtain your AVS? MyChart  If the video visit is dropped, the invitation should be resent by: Text to cell phone: [phone]  Will anyone else be joining your video visit? No      Video Start Time: 2:48 PM    Assessment & Plan     Adjustment disorder with mixed anxiety and depressed mood  Discussed use of medication, common side effects, how medication works and the fact that improvement in anticipated in 4-6 weeks.  She may contact me at anytime in the next month if she is having any side effects or issues with this medication  - escitalopram (LEXAPRO) 10 MG tablet; Take 1 tablet (10 mg) by mouth daily  - MENTAL HEALTH REFERRAL  - Adult; Outpatient Treatment; Individual/Couples/Family/Group Therapy/Health Psychology; Other: [facility] [phone]; We will contact you to schedule the appointment or please call with any questions; Future I also encouraged her to consider counseling through her college as well    Seasonal allergic rhinitis, unspecified trigger  She will use a decongestant in conjunction with her Claritin    Mild single current episode of major depressive disorder (H)  Discussed use of medication, common side effects, how medication works and the fact that improvement in anticipated in 4-6 weeks.                    Return in about 1 month (around 11/18/2021) for depression/anxiety.    Lena Madrid PA-C  [facility]   Deepika is a 21 year old who presents for the following health issues     HPI     Depression and Anxiety Follow-Up    How are you doing with your depression since your last visit? Worsened for the past 3 weeks to 2 months she has had some worsening of her mental health.  She feels more anxious and a bit down.  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  She also works.    How are  you doing with your anxiety since your last visit?  Worsened increased anxiety    Are you having other symptoms that might be associated with depression or anxiety? No    Have you had a significant life event? No   stressful, she is a nursing student    Do you have any concerns with your use of alcohol or other drugs? No    Social History     Tobacco Use     Smoking status: Passive Smoke Exposure - Never Smoker     Smokeless tobacco: Never Used     Tobacco comment: no exposure   Substance Use Topics     Alcohol use: No     Alcohol/week: 0.0 standard drinks     Drug use: No     PHQ 12/23/2020 7/6/2021 9/10/2021   PHQ-9 Total Score 18 0 19   Q9: Thoughts of better off dead/self-harm past 2 weeks Not at all Not at all Not at all     CHEMO-7 SCORE 8/11/2020 12/23/2020 9/10/2021   Total Score - - -   Total Score 14 (moderate anxiety) 20 (severe anxiety) 15 (severe anxiety)   Total Score 14 20 15     Last PHQ-9 9/10/2021   1.  Little interest or pleasure in doing things 3   2.  Feeling down, depressed, or hopeless 2   3.  Trouble falling or staying asleep, or sleeping too much 3   4.  Feeling tired or having little energy 3   5.  Poor appetite or overeating 2   6.  Feeling bad about yourself 1   7.  Trouble concentrating 3   8.  Moving slowly or restless 2   Q9: Thoughts of better off dead/self-harm past 2 weeks 0   PHQ-9 Total Score 19   Difficulty at work, home, or with people -     CHEMO-7  9/10/2021   1. Feeling nervous, anxious, or on edge 3   2. Not being able to stop or control worrying 2   3. Worrying too much about different things 3   4. Trouble relaxing 1   5. Being so restless that it is hard to sit still 2   6. Becoming easily annoyed or irritable 3   7. Feeling afraid, as if something awful might happen 1   CHEMO-7 Total Score 15   If you checked any problems, how difficult have they made it for you to do your work, take care of things at home, or get along with other people? -       Suicide Assessment Five-step  Evaluation and Treatment (SAFE-T)      How many servings of fruits and vegetables do you eat daily?  2-3    On average, how many sweetened beverages do you drink each day (Examples: soda, juice, sweet tea, etc.  Do NOT count diet or artificially sweetened beverages)?   1    How many days per week do you exercise enough to make your heart beat faster? 7    How many minutes a day do you exercise enough to make your heart beat faster? 30 - 60    How many days per week do you miss taking your medication? 0  She is not having any suicidal thoughts plans or intentions.  She not previously been on medications for her depression      Review of Systems   As documented above       Objective           Vitals:  No vitals were obtained today due to virtual visit.    Physical Exam   GENERAL: Healthy, alert and no distress  EYES: Eyes grossly normal to inspection.  No discharge or erythema, or obvious scleral/conjunctival abnormalities.  RESP: No audible wheeze, cough, or visible cyanosis.  No visible retractions or increased work of breathing.    SKIN: Visible skin clear. No significant rash, abnormal pigmentation or lesions.  NEURO: Cranial nerves grossly intact.  Mentation and speech appropriate for age.  PSYCH: Mentation appears normal, affect normal/bright, judgement and insight intact, normal speech and appearance well-groomed.                Video-Visit Details    Type of service:  Video Visit    Video End Time:3:03 PM    Originating Location (pt. Location): Home    Distant Location (provider location):  [facility]     Platform used for Video Visit: [employer]

## 2021-10-23 ENCOUNTER — HEALTH MAINTENANCE LETTER (OUTPATIENT)
Age: 21
End: 2021-10-23

## 2021-11-11 ENCOUNTER — TELEPHONE (OUTPATIENT)
Dept: FAMILY MEDICINE | Facility: OTHER | Age: 21
End: 2021-11-11
Payer: COMMERCIAL

## 2021-11-11 DIAGNOSIS — F43.23 ADJUSTMENT DISORDER WITH MIXED ANXIETY AND DEPRESSED MOOD: ICD-10-CM

## 2021-11-11 NOTE — TELEPHONE ENCOUNTER
escitalopram (LEXAPRO) 10 MG tablet    Pharmacy message: 90 days supply: request for authrization    [facility]

## 2021-11-11 NOTE — TELEPHONE ENCOUNTER
Has one 30 day refill left, requesting 90 day refill. Due for follow up first?    Alvarez Connolly, LOLAN, RN

## 2021-11-12 RX ORDER — ESCITALOPRAM OXALATE 10 MG/1
10 TABLET ORAL DAILY
Qty: 90 TABLET | Refills: 0 | Status: SHIPPED | OUTPATIENT
Start: 2021-11-12 | End: 2023-01-23

## 2021-11-12 NOTE — TELEPHONE ENCOUNTER
Sent a 90 day but usually we like to make sure medication is working well and dose doesn't need to be adjusted after starting these medications.     Alvarado Durbin PA-C

## 2022-01-26 DIAGNOSIS — N94.6 DYSMENORRHEA: ICD-10-CM

## 2022-01-26 DIAGNOSIS — L70.0 ACNE VULGARIS: ICD-10-CM

## 2022-01-26 RX ORDER — NORGESTIMATE AND ETHINYL ESTRADIOL 7DAYSX3 28
KIT ORAL
Qty: 84 TABLET | Refills: 0 | Status: SHIPPED | OUTPATIENT
Start: 2022-01-26 | End: 2022-01-27

## 2022-01-27 ENCOUNTER — MYC REFILL (OUTPATIENT)
Dept: FAMILY MEDICINE | Facility: OTHER | Age: 22
End: 2022-01-27
Payer: COMMERCIAL

## 2022-01-27 DIAGNOSIS — L70.0 ACNE VULGARIS: ICD-10-CM

## 2022-01-27 DIAGNOSIS — N94.6 DYSMENORRHEA: ICD-10-CM

## 2022-01-27 RX ORDER — NORGESTIMATE AND ETHINYL ESTRADIOL 7DAYSX3 28
1 KIT ORAL DAILY
Qty: 84 TABLET | Refills: 0 | Status: SHIPPED | OUTPATIENT
Start: 2022-01-27 | End: 2022-08-02

## 2022-01-27 NOTE — TELEPHONE ENCOUNTER
Prescription approved per [facility] Refill Protocol.  LOLA HutsonN, RN, PHN  [place]  January 27, 2022

## 2022-07-30 DIAGNOSIS — L70.0 ACNE VULGARIS: ICD-10-CM

## 2022-07-30 DIAGNOSIS — N94.6 DYSMENORRHEA: ICD-10-CM

## 2022-08-02 RX ORDER — NORGESTIMATE AND ETHINYL ESTRADIOL 7DAYSX3 28
KIT ORAL
Qty: 84 TABLET | Refills: 0 | Status: SHIPPED | OUTPATIENT
Start: 2022-08-02 | End: 2023-01-23

## 2022-08-02 NOTE — TELEPHONE ENCOUNTER
Pending Prescriptions:                       Disp   Refills    TRI FEMYNOR 0.18/0.215/0.25 MG-35 MCG tab*84 tab*0            Sig: TAKE 1 TABLET BY MOUTH EVERY DAY    Medication is being filled for 1 time manan refill only due to:  Patient is due for annual    Please call and help schedule.  Thank you!

## 2022-09-26 NOTE — TELEPHONE ENCOUNTER
Retinal detachment warnings given. cleocin      Last Written Prescription Date: 6/14/17  Last Fill Quantity: 60ml,  # refills: 1   Last Office Visit with [facility], [facility] or [facility] prescribing provider: 4/24/17                                         Next 5 appointments (look out 90 days)     Oct 09, 2017  6:15 PM CDT   Edi Kelly with Lena Madrid PA-C   [facility] ([facility])    [address] [id number] [phone]

## 2022-10-09 ENCOUNTER — HEALTH MAINTENANCE LETTER (OUTPATIENT)
Age: 22
End: 2022-10-09

## 2022-11-26 ENCOUNTER — HEALTH MAINTENANCE LETTER (OUTPATIENT)
Age: 22
End: 2022-11-26

## 2023-01-22 ASSESSMENT — ENCOUNTER SYMPTOMS
COUGH: 0
SHORTNESS OF BREATH: 0
MYALGIAS: 0
EYE PAIN: 0
CHILLS: 0
DYSURIA: 0
HEMATURIA: 0
FEVER: 0
ARTHRALGIAS: 0
NAUSEA: 0
NERVOUS/ANXIOUS: 1
HEADACHES: 0
CONSTIPATION: 0
BREAST MASS: 0
WEAKNESS: 0
DIARRHEA: 0
PARESTHESIAS: 0
HEMATOCHEZIA: 0
PALPITATIONS: 0
SORE THROAT: 0
DIZZINESS: 0
FREQUENCY: 0
ABDOMINAL PAIN: 0
HEARTBURN: 0
JOINT SWELLING: 0

## 2023-01-22 ASSESSMENT — PATIENT HEALTH QUESTIONNAIRE - PHQ9
10. IF YOU CHECKED OFF ANY PROBLEMS, HOW DIFFICULT HAVE THESE PROBLEMS MADE IT FOR YOU TO DO YOUR WORK, TAKE CARE OF THINGS AT HOME, OR GET ALONG WITH OTHER PEOPLE: SOMEWHAT DIFFICULT
SUM OF ALL RESPONSES TO PHQ QUESTIONS 1-9: 18
SUM OF ALL RESPONSES TO PHQ QUESTIONS 1-9: 18

## 2023-01-23 ENCOUNTER — OFFICE VISIT (OUTPATIENT)
Dept: FAMILY MEDICINE | Facility: CLINIC | Age: 23
End: 2023-01-23
Payer: COMMERCIAL

## 2023-01-23 VITALS
OXYGEN SATURATION: 99 % | DIASTOLIC BLOOD PRESSURE: 80 MMHG | SYSTOLIC BLOOD PRESSURE: 122 MMHG | HEART RATE: 75 BPM | BODY MASS INDEX: 25.39 KG/M2 | HEIGHT: 68 IN | TEMPERATURE: 98.9 F | WEIGHT: 167.5 LBS

## 2023-01-23 DIAGNOSIS — Z23 NEED FOR VACCINATION: ICD-10-CM

## 2023-01-23 DIAGNOSIS — Z11.59 NEED FOR HEPATITIS C SCREENING TEST: ICD-10-CM

## 2023-01-23 DIAGNOSIS — F32.0 MILD SINGLE CURRENT EPISODE OF MAJOR DEPRESSIVE DISORDER (H): ICD-10-CM

## 2023-01-23 DIAGNOSIS — N94.6 DYSMENORRHEA: ICD-10-CM

## 2023-01-23 DIAGNOSIS — Z12.4 CERVICAL CANCER SCREENING: ICD-10-CM

## 2023-01-23 DIAGNOSIS — Z00.00 ROUTINE GENERAL MEDICAL EXAMINATION AT A HEALTH CARE FACILITY: Primary | ICD-10-CM

## 2023-01-23 DIAGNOSIS — L70.0 ACNE VULGARIS: ICD-10-CM

## 2023-01-23 PROCEDURE — 99213 OFFICE O/P EST LOW 20 MIN: CPT | Mod: 25 | Performed by: PHYSICIAN ASSISTANT

## 2023-01-23 PROCEDURE — 91312 COVID-19 VACCINE BIVALENT BOOSTER 12+ (PFIZER): CPT | Performed by: PHYSICIAN ASSISTANT

## 2023-01-23 PROCEDURE — G0145 SCR C/V CYTO,THINLAYER,RESCR: HCPCS | Performed by: PHYSICIAN ASSISTANT

## 2023-01-23 PROCEDURE — 99395 PREV VISIT EST AGE 18-39: CPT | Mod: 25 | Performed by: PHYSICIAN ASSISTANT

## 2023-01-23 PROCEDURE — 90651 9VHPV VACCINE 2/3 DOSE IM: CPT | Performed by: PHYSICIAN ASSISTANT

## 2023-01-23 PROCEDURE — 0124A COVID-19 VACCINE BIVALENT BOOSTER 12+ (PFIZER): CPT | Performed by: PHYSICIAN ASSISTANT

## 2023-01-23 PROCEDURE — 90471 IMMUNIZATION ADMIN: CPT | Performed by: PHYSICIAN ASSISTANT

## 2023-01-23 RX ORDER — NORGESTIMATE AND ETHINYL ESTRADIOL 7DAYSX3 28
1 KIT ORAL DAILY
Qty: 84 TABLET | Refills: 3 | Status: SHIPPED | OUTPATIENT
Start: 2023-01-23 | End: 2024-02-12

## 2023-01-23 ASSESSMENT — ENCOUNTER SYMPTOMS
NERVOUS/ANXIOUS: 1
DIARRHEA: 0
JOINT SWELLING: 0
NAUSEA: 0
WEAKNESS: 0
HEARTBURN: 0
ARTHRALGIAS: 0
FREQUENCY: 0
MYALGIAS: 0
HEMATURIA: 0
CONSTIPATION: 0
PARESTHESIAS: 0
ABDOMINAL PAIN: 0
DIZZINESS: 0
BREAST MASS: 0
CHILLS: 0
DYSURIA: 0
COUGH: 0
FEVER: 0
HEMATOCHEZIA: 0
EYE PAIN: 0
SHORTNESS OF BREATH: 0
PALPITATIONS: 0
HEADACHES: 0
SORE THROAT: 0

## 2023-01-23 ASSESSMENT — PAIN SCALES - GENERAL: PAINLEVEL: NO PAIN (0)

## 2023-01-23 NOTE — NURSING NOTE
Prior to immunization administration, verified patients identity using patient s name and date of birth. Please see Immunization Activity for additional information.     Screening Questionnaire for Adult Immunization    Are you sick today?   No   Do you have allergies to medications, food, a vaccine component or latex?   Yes   Have you ever had a serious reaction after receiving a vaccination?   No   Do you have a long-term health problem with heart, lung, kidney, or metabolic disease (e.g., diabetes), asthma, a blood disorder, no spleen, complement component deficiency, a cochlear implant, or a spinal fluid leak?  Are you on long-term aspirin therapy?   Yes   Do you have cancer, leukemia, HIV/AIDS, or any other immune system problem?   No   Do you have a parent, brother, or sister with an immune system problem?   Yes   In the past 3 months, have you taken medications that affect  your immune system, such as prednisone, other steroids, or anticancer drugs; drugs for the treatment of rheumatoid arthritis, Crohn s disease, or psoriasis; or have you had radiation treatments?   No   Have you had a seizure, or a brain or other nervous system problem?   No   During the past year, have you received a transfusion of blood or blood    products, or been given immune (gamma) globulin or antiviral drug?   No   For women: Are you pregnant or is there a chance you could become       pregnant during the next month?   No   Have you received any vaccinations in the past 4 weeks?   No     Immunization questionnaire was positive for at least one answer.  Notified provider.        Per orders of Lena Madrid PA-C, injection of HPV given by Ladi Chamberlain. Patient instructed to remain in clinic for 15 minutes afterwards, and to report any adverse reaction to me immediately.       Screening performed by Ladi Chamberlain on 1/23/2023 at 2:34 PM.

## 2023-01-23 NOTE — PROGRESS NOTES
SUBJECTIVE:   CC: Deepika is an 22 year old who presents for preventive health visit.     Healthy Habits:     Getting at least 3 servings of Calcium per day:  NO    Bi-annual eye exam:  NO    Dental care twice a year:  NO    Sleep apnea or symptoms of sleep apnea:  Daytime drowsiness    Diet:  Regular (no restrictions) and Breakfast skipped    Frequency of exercise:  2-3 days/week    Duration of exercise:  30-45 minutes    Taking medications regularly:  Not Applicable    Medication side effects:  None    PHQ-2 Total Score: 3    Additional concerns today:  No        Today's PHQ-2 Score:   PHQ-2 ( 1999 Pfizer) 1/22/2023   Q1: Little interest or pleasure in doing things 2   Q2: Feeling down, depressed or hopeless 1   PHQ-2 Score 3   PHQ-2 Total Score (12-17 Years)- Positive if 3 or more points; Administer PHQ-A if positive -   Q1: Little interest or pleasure in doing things More than half the days   Q2: Feeling down, depressed or hopeless Several days   PHQ-2 Score 3       Have you ever done Advance Care Planning? (For example, a Health Directive, POLST, or a discussion with a medical provider or your loved ones about your wishes): No, advance care planning information given to patient to review.  Patient plans to discuss their wishes with loved ones or provider.      Social History     Tobacco Use     Smoking status: Never     Passive exposure: Yes     Smokeless tobacco: Never     Tobacco comments:     no exposure   Substance Use Topics     Alcohol use: Yes       Alcohol Use 1/22/2023   Prescreen: >3 drinks/day or >7 drinks/week? No   Prescreen: >3 drinks/day or >7 drinks/week? -       Reviewed orders with patient.  Reviewed health maintenance and updated orders accordingly - Yes  Labs reviewed in EPIC  BP Readings from Last 3 Encounters:   01/23/23 122/80   07/06/21 106/60   08/13/20 116/68    Wt Readings from Last 3 Encounters:   01/23/23 76 kg (167 lb 8 oz)   07/06/21 74.4 kg (164 lb)   08/13/20 73.5 kg (162 lb)                   Patient Active Problem List   Diagnosis     Sinusitis, chronic     Allergic rhinitis     Headache     Acne     Adjustment disorder with mixed anxiety and depressed mood     Dysmenorrhea     CHEMO (generalized anxiety disorder)     Mild single current episode of major depressive disorder (H)     Acne vulgaris     Past Surgical History:   Procedure Laterality Date     ZZHC CREATE EARDRUM OPENING,GEN ANESTH  2001    P.E. Tubes       Social History     Tobacco Use     Smoking status: Never     Passive exposure: Yes     Smokeless tobacco: Never     Tobacco comments:     no exposure   Substance Use Topics     Alcohol use: Yes     Family History   Problem Relation Age of Onset     Alcohol/Drug Maternal Grandfather         recovering alcoholic     Blood Disease Maternal Grandfather         Hodgkins     Cancer Maternal Grandfather         non-hodgkins lymphoma     Heart Disease Maternal Grandfather         arrythmias     Hypertension Maternal Grandfather      Lipids Maternal Grandfather      Coronary Artery Disease Maternal Grandfather      Other Cancer Maternal Grandfather         Hodgkins     Hyperlipidemia Maternal Grandfather      Depression Maternal Grandfather      Substance Abuse Maternal Grandfather      Anesthesia Reaction Maternal Grandmother      Cancer Maternal Grandmother         Skin     Blood Disease Maternal Grandmother         Non-Hodgkins     Hypertension Maternal Grandmother      Lipids Maternal Grandmother      Obesity Maternal Grandmother      Cerebrovascular Disease Maternal Grandmother      Other Cancer Maternal Grandmother         non hodgkins/skin/CLL     Coronary Artery Disease Maternal Grandmother      Hyperlipidemia Maternal Grandmother      Depression Maternal Grandmother      Anxiety Disorder Maternal Grandmother      Heart Disease Brother         PDA & ASD     Congenital Anomalies Brother         Downs     Genetic Disorder Brother         down syndrome     Obesity Brother       Coronary Artery Disease Paternal Grandmother      Hypertension Paternal Grandmother      Osteoporosis Paternal Grandmother      Hyperlipidemia Paternal Grandmother      Coronary Artery Disease Paternal Grandfather      Hypertension Paternal Grandfather      Hyperlipidemia Paternal Grandfather      Substance Abuse Paternal Grandfather      Hypertension Mother      Depression Mother      Asthma Mother      Depression Father      Anxiety Disorder Father      Obesity Brother      Anxiety Disorder Brother          Current Outpatient Medications   Medication Sig Dispense Refill     albuterol (PROAIR HFA) 108 (90 BASE) MCG/ACT inhaler Inhale 1-2 puffs into the lungs every 4 hours as needed for shortness of breath / dyspnea. 2 Inhaler prn     clindamycin (CLINDAMAX) 1 % external gel APPLY TOPICALLY TWICE A DAY 60 g 10     IBUPROFEN PO Reported on 4/24/2017       loratadine (CLARITIN) 10 MG tablet Take 10 mg by mouth daily       SUMAtriptan (IMITREX) 50 MG tablet TAKE ONE TABLET BY MOUTH AT ONSET OF HEADACHE FOR MIGRAINE. MAY REPEAT IN 2 HOURS IF NEEDED. MAXIMUM OF 2 TABLETS PER DAY 9 tablet 1     TRI FEMYNOR 0.18/0.215/0.25 MG-35 MCG tablet TAKE 1 TABLET BY MOUTH EVERY DAY 84 tablet 0     triamcinolone (NASACORT) 55 MCG/ACT nasal inhaler Spray 2 sprays into both nostrils 2 times daily Reported on 4/24/2017       escitalopram (LEXAPRO) 10 MG tablet Take 1 tablet (10 mg) by mouth daily 90 tablet 0     Allergies   Allergen Reactions     Blueberry Flavor Hives     Griseofulvin      rash     Penicillins      augmentin     Quinolones      floxin  rash       Breast Cancer Screening:        History of abnormal Pap smear: NO - age 21-29 PAP every 3 years recommended     Reviewed and updated as needed this visit by clinical staff   Tobacco  Allergies  Meds              Reviewed and updated as needed this visit by Provider                     Review of Systems   Constitutional: Negative for chills and fever.   HENT: Negative for  "congestion, ear pain, hearing loss and sore throat.    Eyes: Negative for pain and visual disturbance.   Respiratory: Negative for cough and shortness of breath.    Cardiovascular: Negative for chest pain, palpitations and peripheral edema.   Gastrointestinal: Negative for abdominal pain, constipation, diarrhea, heartburn, hematochezia and nausea.   Breasts:  Positive for tenderness. Negative for breast mass and discharge.   Genitourinary: Positive for vaginal bleeding. Negative for dysuria, frequency, genital sores, hematuria, pelvic pain, urgency and vaginal discharge.   Musculoskeletal: Negative for arthralgias, joint swelling and myalgias.   Skin: Negative for rash.   Neurological: Negative for dizziness, weakness, headaches and paresthesias.   Psychiatric/Behavioral: Positive for mood changes. The patient is nervous/anxious.      She is doing very well with her current birth control pills.  She has no concerns or issues with its use.    She does get breast tenderness with her menses.  She is currently menstruating.  The breast pain is bilateral.  The vaginal bleeding as above refers to her menses which she has currently.      She does have some anxiety and history of depression.  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  This is very stressful she has just finished orientation and is now out on her own.  She states she is doing fine and does not think she needs medications at this time but will consider this.  We did prescribe Lexapro to her in the past but she does not recall doing well with that.     OBJECTIVE:   /80 (BP Location: Left arm, Patient Position: Chair, Cuff Size: Adult Regular)   Pulse 75   Temp 98.9  F (37.2  C) (Temporal)   Ht 1.735 m (5' 8.31\")   Wt 76 kg (167 lb 8 oz)   LMP 01/18/2023 (Approximate)   SpO2 99%   Breastfeeding No   BMI 25.24 kg/m    Physical Exam  GENERAL: healthy, alert and no distress  EYES: Eyes grossly " normal to inspection, PERRL and conjunctivae and sclerae normal  HENT: ear canals and TM's normal, nose and mouth without ulcers or lesions  NECK: no adenopathy, no asymmetry, masses, or scars and thyroid normal to palpation  RESP: lungs clear to auscultation - no rales, rhonchi or wheezes  BREAST: normal without masses, tenderness or nipple discharge and no palpable axillary masses or adenopathy  CV: regular rate and rhythm, normal S1 S2, no S3 or S4, no murmur, click or rub, no peripheral edema and peripheral pulses strong  ABDOMEN: soft, nontender, no hepatosplenomegaly, no masses and bowel sounds normal   (female): normal female external genitalia, normal urethral meatus, vaginal mucosa pink, moist, well rugated, and normal cervix/adnexa/uterus without masses or discharge  MS: no gross musculoskeletal defects noted, no edema  SKIN: no suspicious lesions or rashes  NEURO: Normal strength and tone, mentation intact and speech normal  PSYCH: mentation appears normal, affect normal/bright    Diagnostic Test Results:  Labs reviewed in Epic  No results found for this or any previous visit (from the past 24 hour(s)).    ASSESSMENT/PLAN:   (Z00.00) Routine general medical examination at a health care facility  (primary encounter diagnosis)  Comment: Recommend routine annual visits  Plan: Updating cancer screening today    (Z11.59) Need for hepatitis C screening test  Comment:   Plan: Patient declined    (Z12.4) Cervical cancer screening  Comment: Pending  Plan: Pap Screen only - recommended age 21 - 24 years            (L70.0) Acne vulgaris  Comment: We will refill birth control for 1 year  Plan: norgestim-eth estrad triphasic (TRI FEMYNOR)         0.18/0.215/0.25 MG-35 MCG tablet            (N94.6) Dysmenorrhea  Comment: Refill birth control for 1 year  Plan: norgestim-eth estrad triphasic (TRI FEMYNOR)         0.18/0.215/0.25 MG-35 MCG tablet            (Z23) Need for vaccination  Comment: Updating  vaccinations  Plan: Human Papilloma Virus Vaccine (Gardasil 9) 3         Dose IM, COVID-19,PF,PFIZER BOOSTER BIVALENT         (12+YRS)            (F32.0) Mild single current episode of major depressive disorder (H)  Comment: Patient to consider medications  Plan: We can do virtual visit as needed    Patient has been advised of split billing requirements and indicates understanding: Yes      COUNSELING:  Reviewed preventive health counseling, as reflected in patient instructions        She reports that she has never smoked. She has been exposed to tobacco smoke. She has never used smokeless tobacco.          Lena Madrid PA-C  [facility]  Answers for HPI/ROS submitted by the patient on 1/22/2023  If you checked off any problems, how difficult have these problems made it for you to do your work, take care of things at home, or get along with other people?: Somewhat difficult  PHQ9 TOTAL SCORE: 18

## 2023-01-26 LAB
BKR LAB AP GYN ADEQUACY: NORMAL
BKR LAB AP GYN INTERPRETATION: NORMAL
BKR LAB AP HPV REFLEX: NO
BKR LAB AP LMP: NORMAL
BKR LAB AP PREVIOUS ABNORMAL: NORMAL
PATH REPORT.COMMENTS IMP SPEC: NORMAL
PATH REPORT.COMMENTS IMP SPEC: NORMAL
PATH REPORT.RELEVANT HX SPEC: NORMAL

## 2023-07-31 ENCOUNTER — MYC MEDICAL ADVICE (OUTPATIENT)
Dept: FAMILY MEDICINE | Facility: CLINIC | Age: 23
End: 2023-07-31

## 2023-07-31 ENCOUNTER — E-VISIT (OUTPATIENT)
Dept: URGENT CARE | Facility: CLINIC | Age: 23
End: 2023-07-31
Payer: COMMERCIAL

## 2023-07-31 DIAGNOSIS — L73.9 FOLLICULITIS: Primary | ICD-10-CM

## 2023-07-31 PROCEDURE — 99207 PR NON-BILLABLE SERV PER CHARTING: CPT | Performed by: FAMILY MEDICINE

## 2023-07-31 RX ORDER — MUPIROCIN 20 MG/G
OINTMENT TOPICAL 2 TIMES DAILY
Qty: 30 G | Refills: 0 | Status: SHIPPED | OUTPATIENT
Start: 2023-07-31 | End: 2024-02-12

## 2023-07-31 NOTE — PATIENT INSTRUCTIONS
"Dear Deepika Wheeler    Your skin concern looks to be consistent with folliculitis.  I did go ahead and prescribe a topical antibiotic ointment to use twice daily in your armpit area.    Folliculitis: Care Instructions  Overview     Folliculitis (say \"kru-AJD-zcd-LY-tus\") is an inflammation of the pouches (follicles) in the skin where hair grows. It can occur on any part of the body, but it is most common on the scalp, face, armpits, and groin. Bacteria, such as those found in a hot tub, can cause folliculitis. But folliculitis can also be caused by other organisms, such as fungi or parasites.    Folliculitis begins as a red, tender area near a strand of hair. The skin can itch or burn and may drain pus or blood. Sometimes folliculitis can lead to more serious skin infections.  Your doctor usually can treat mild folliculitis with an antibiotic cream or ointment. If you have folliculitis on your scalp, you may use a medicated shampoo. Antibiotics you take as pills can treat infections deeper in the skin. Other treatments that may be used include antifungal and antiparasitic medicines.  Folliculitis may be caused by ingrown hairs from shaving. One solution is to stop shaving. If that isn't an option, using an electric razor that doesn't shave so close may help. Laser treatment may also be an option. Laser treatment destroys the hair follicle so hair will no longer grow in the treated area.    Follow-up care is a key part of your treatment and safety. Be sure to make and go to all appointments, and call your doctor if you are having problems. It's also a good idea to know your test results and keep a list of the medicines you take.  How can you care for yourself at home?  Take your medicine exactly as prescribed. If your doctor prescribed antibiotics, take them as directed. Do not stop taking them just because you feel better. You need to take the full course of antibiotics.  To help with itching or pain, put a warm, " "moist cloth (like a clean washcloth) on the area for 5 to 10 minutes, 3 to 6 times a day.  Do not share your razor, towel, or washcloth. That can spread folliculitis.  If folliculitis is caused by shaving, try to avoid shaving for at least a month. If that isn't an option, use an electric razor that doesn't shave so close. Or if you need a clean shave, use shaving cream or gel and always shave in the direction that the hair grows.  When should you call for help?   Call your doctor now or seek immediate medical care if:    You have symptoms of infection, such as:  Increased pain, swelling, warmth, or redness.  Red streaks leading from the area.  Pus draining from the area.  A fever.   Watch closely for changes in your health, and be sure to contact your doctor if:    You do not get better as expected.   Where can you learn more?  Go to https://www.[employer].net/patiented  Enter M257 in the search box to learn more about \"Folliculitis: Care Instructions.\"  Current as of: March 22, 2023               Content Version: 13.7    [id number] [employer].   Care instructions adapted under license by your healthcare professional. If you have questions about a medical condition or this instruction, always ask your healthcare professional. [employer] disclaims any warranty or liability for your use of this information.    Thanks for choosing us as your health care partner,    ALFRED MEDRANO CNP  "

## 2023-08-10 ENCOUNTER — OFFICE VISIT (OUTPATIENT)
Dept: FAMILY MEDICINE | Facility: OTHER | Age: 23
End: 2023-08-10
Payer: COMMERCIAL

## 2023-08-10 VITALS
HEIGHT: 68 IN | OXYGEN SATURATION: 99 % | TEMPERATURE: 97.5 F | WEIGHT: 171.5 LBS | DIASTOLIC BLOOD PRESSURE: 72 MMHG | RESPIRATION RATE: 18 BRPM | SYSTOLIC BLOOD PRESSURE: 126 MMHG | BODY MASS INDEX: 25.99 KG/M2 | HEART RATE: 93 BPM

## 2023-08-10 DIAGNOSIS — L73.9 FOLLICULITIS: Primary | ICD-10-CM

## 2023-08-10 PROCEDURE — 99213 OFFICE O/P EST LOW 20 MIN: CPT | Performed by: PHYSICIAN ASSISTANT

## 2023-08-10 RX ORDER — DOXYCYCLINE 100 MG/1
100 CAPSULE ORAL 2 TIMES DAILY
Qty: 20 CAPSULE | Refills: 0 | Status: SHIPPED | OUTPATIENT
Start: 2023-08-10 | End: 2023-08-21

## 2023-08-10 ASSESSMENT — PATIENT HEALTH QUESTIONNAIRE - PHQ9
SUM OF ALL RESPONSES TO PHQ QUESTIONS 1-9: 18
10. IF YOU CHECKED OFF ANY PROBLEMS, HOW DIFFICULT HAVE THESE PROBLEMS MADE IT FOR YOU TO DO YOUR WORK, TAKE CARE OF THINGS AT HOME, OR GET ALONG WITH OTHER PEOPLE: SOMEWHAT DIFFICULT
SUM OF ALL RESPONSES TO PHQ QUESTIONS 1-9: 18

## 2023-08-10 ASSESSMENT — PAIN SCALES - GENERAL: PAINLEVEL: NO PAIN (0)

## 2023-08-10 NOTE — PATIENT INSTRUCTIONS
Will treat with doxycycline for 10 days.  If not improving within the next week, let me know and can try a different antibiotics.    Try benzoyl peroxide washes.  Avoid shaving and deodorant until symptoms are gone.  Make sure you get a new razor blade and deodorant to avoid recontamination.

## 2023-08-10 NOTE — PROGRESS NOTES
Assessment & Plan       ICD-10-CM    1. Folliculitis  L73.9 doxycycline hyclate (VIBRAMYCIN) 100 MG capsule          Photo taken with patient's permission and this does appear most consistent with a folliculitis, likely staph, although cannot rule out pseudomonas. Will treat with oral doxycycline for 10 days. I recommend she avoid deodorant and shaving until symptoms resolve. Can try benzoyl peroxide in the axillae as well. If not improving over the next week, will consider treating with Levaquin to cover for pseudomonas. She has an allergy listed to Floxin as an infant (rash) but does not look like she has ever been on Levaquin so would monitor closely if we tried this. I recommend she get a new razor and deodorant.     CATHY Holt [facility]    Autumn Poe is a 23 year old, presenting for the following health issues:  Rash      8/10/2023     8:05 AM   Additional Questions   Roomed by Fiordaliza DANIELS   Accompanied by Self       Rash  Associated symptoms include a rash.   History of Present Illness       Reason for visit:  Following up on rash    She eats 0-1 servings of fruits and vegetables daily.She consumes 1 sweetened beverage(s) daily.She exercises with enough effort to increase her heart rate 30 to 60 minutes per day.  She exercises with enough effort to increase her heart rate 3 or less days per week. She is missing 7 dose(s) of medications per week.     The rash started a few weeks ago as a smaller area in the left arm pit. She then [place] and after swimming in the lake, the rash seemed to worsen. She was prescribed Bactroban ointment for folliculitis on 7/31 but this has not provided any improvement in her symptoms and she now has a few spots in the right arm pit as well. There is no pain or itching. She has never had a rash like this before.       Review of Systems   Skin:  Positive for rash.    Constitutional, HEENT,  "cardiovascular, pulmonary, gi and gu systems are negative, except as otherwise noted.      Objective    /72   Pulse 93   Temp 97.5  F (36.4  C) (Temporal)   Resp 18   Ht 5' 8.31\" (1.735 m)   Wt 171 lb 8 oz (77.8 kg)   LMP 08/03/2023 (Approximate)   SpO2 99%   BMI 25.84 kg/m    Body mass index is 25.84 kg/m .  Physical Exam   GENERAL: healthy, alert and no distress  RESP: lungs clear to auscultation - no rales, rhonchi or wheezes  CV: regular rate and rhythm, normal S1 S2, no S3 or S4, no murmur, click or rub  SKIN: Small, erythematous papules and a few pustules over left axilla and a few in the right axilla. No tenderness, weeping or lymphadenopathy. Photo taken with patient's permission.                 "

## 2023-12-04 ENCOUNTER — MYC MEDICAL ADVICE (OUTPATIENT)
Dept: FAMILY MEDICINE | Facility: CLINIC | Age: 23
End: 2023-12-04
Payer: COMMERCIAL

## 2023-12-04 NOTE — TELEPHONE ENCOUNTER
Patient Quality Outreach    Patient is due for the following:   Depression  -  PHQ-9 needed  Physical Preventive Adult Physical,  - Due after 1/23/2024  Chlamydia Screening      Topic Date Due    HPV Vaccine (3 - 3-dose series) 04/17/2023    Flu Vaccine (1) 09/01/2023    COVID-19 Vaccine (4 - 2023-24 season) 09/01/2023       Next Steps:   Schedule a Adult Preventative  Patient was assigned appropriate questionnaire to complete    Type of outreach:    Sent [employer] message.  Call in 1 week if not read/completed to schedule; if read but PHQ9 not completed send letter    Questions for provider review:    None           Tricia Smith, [facility]  Chart routed to Care Team.

## 2024-02-10 SDOH — HEALTH STABILITY: PHYSICAL HEALTH: ON AVERAGE, HOW MANY DAYS PER WEEK DO YOU ENGAGE IN MODERATE TO STRENUOUS EXERCISE (LIKE A BRISK WALK)?: 2 DAYS

## 2024-02-10 SDOH — HEALTH STABILITY: PHYSICAL HEALTH: ON AVERAGE, HOW MANY MINUTES DO YOU ENGAGE IN EXERCISE AT THIS LEVEL?: 30 MIN

## 2024-02-10 ASSESSMENT — SOCIAL DETERMINANTS OF HEALTH (SDOH): HOW OFTEN DO YOU GET TOGETHER WITH FRIENDS OR RELATIVES?: TWICE A WEEK

## 2024-02-11 ASSESSMENT — PATIENT HEALTH QUESTIONNAIRE - PHQ9
SUM OF ALL RESPONSES TO PHQ QUESTIONS 1-9: 12
SUM OF ALL RESPONSES TO PHQ QUESTIONS 1-9: 12
10. IF YOU CHECKED OFF ANY PROBLEMS, HOW DIFFICULT HAVE THESE PROBLEMS MADE IT FOR YOU TO DO YOUR WORK, TAKE CARE OF THINGS AT HOME, OR GET ALONG WITH OTHER PEOPLE: SOMEWHAT DIFFICULT

## 2024-02-12 ENCOUNTER — OFFICE VISIT (OUTPATIENT)
Dept: FAMILY MEDICINE | Facility: CLINIC | Age: 24
End: 2024-02-12
Attending: PHYSICIAN ASSISTANT
Payer: COMMERCIAL

## 2024-02-12 VITALS
HEIGHT: 69 IN | DIASTOLIC BLOOD PRESSURE: 70 MMHG | TEMPERATURE: 98 F | RESPIRATION RATE: 16 BRPM | WEIGHT: 170 LBS | OXYGEN SATURATION: 99 % | HEART RATE: 74 BPM | BODY MASS INDEX: 25.18 KG/M2 | SYSTOLIC BLOOD PRESSURE: 118 MMHG

## 2024-02-12 DIAGNOSIS — Z11.59 NEED FOR HEPATITIS C SCREENING TEST: ICD-10-CM

## 2024-02-12 DIAGNOSIS — R51.9 NONINTRACTABLE EPISODIC HEADACHE, UNSPECIFIED HEADACHE TYPE: ICD-10-CM

## 2024-02-12 DIAGNOSIS — Z00.00 ROUTINE GENERAL MEDICAL EXAMINATION AT A HEALTH CARE FACILITY: Primary | ICD-10-CM

## 2024-02-12 DIAGNOSIS — L70.0 ACNE VULGARIS: ICD-10-CM

## 2024-02-12 PROCEDURE — 99395 PREV VISIT EST AGE 18-39: CPT | Mod: 25 | Performed by: PHYSICIAN ASSISTANT

## 2024-02-12 PROCEDURE — 36415 COLL VENOUS BLD VENIPUNCTURE: CPT | Performed by: PHYSICIAN ASSISTANT

## 2024-02-12 PROCEDURE — 86803 HEPATITIS C AB TEST: CPT | Performed by: PHYSICIAN ASSISTANT

## 2024-02-12 PROCEDURE — 99213 OFFICE O/P EST LOW 20 MIN: CPT | Mod: 25 | Performed by: PHYSICIAN ASSISTANT

## 2024-02-12 RX ORDER — SUMATRIPTAN 50 MG/1
TABLET, FILM COATED ORAL
Qty: 9 TABLET | Refills: 3 | Status: SHIPPED | OUTPATIENT
Start: 2024-02-12

## 2024-02-12 RX ORDER — CLINDAMYCIN PHOSPHATE 10 MG/G
GEL TOPICAL 2 TIMES DAILY
Qty: 60 G | Refills: 11 | Status: SHIPPED | OUTPATIENT
Start: 2024-02-12

## 2024-02-12 ASSESSMENT — PAIN SCALES - GENERAL: PAINLEVEL: MILD PAIN (3)

## 2024-02-12 NOTE — PROGRESS NOTES
"Preventive Care Visit  [facility] YAMEL Madrid PA-C, Family Medicine  Feb 12, 2024    Assessment & Plan     Routine general medical examination at a health care facility  Patient is considering an IUD, she does not need contraception currently but does states she has considerable heavy menses.  She may contact us and we can help her coordinate finding a provider in [place] to place this IUD since it is closer to where she lives and I do not do IUDs    Need for hepatitis C screening test    - Hepatitis C Screen Reflex to HCV RNA Quant and Genotype; Future    Nonintractable episodic headache, unspecified headache type  She will work hard to maintain normal sleep pattern, also to maintain hydration and her food intake  - SUMAtriptan (IMITREX) 50 MG tablet; TAKE ONE TABLET BY MOUTH AT ONSET OF HEADACHE FOR MIGRAINE. MAY REPEAT IN 2 HOURS IF NEEDED. MAXIMUM OF 2 TABLETS PER DAY    Acne vulgaris  Patient requesting refill  - clindamycin (CLEOCIN-T) 1 % external gel; Apply topically 2 times daily    Insomnia-shift worker and anxiety-  For now she will continue with counseling she is aware she may contact me at any time virtually to consider medications for sleep and/or anxiety and depression.      BMI  Estimated body mass index is 25.47 kg/m  as calculated from the following:    Height as of this encounter: 1.74 m (5' 8.5\").    Weight as of this encounter: 77.1 kg (170 lb).   Weight management plan: Discussed healthy diet and exercise guidelines    Counseling  Appropriate preventive services were discussed with this patient, including applicable screening as appropriate for fall prevention, nutrition, physical activity, Tobacco-use cessation, weight loss and cognition.  Checklist reviewing preventive services available has been given to the patient.  Reviewed patient's diet, addressing concerns and/or questions.   She is at risk for lack of exercise and has been provided with information to increase " physical activity for the benefit of her well-being.   The patient was instructed to see the dentist every 6 months.   The patient's PHQ-9 score is consistent with moderate depression. She was provided with information regarding depression.     Patient has been advised of split billing requirements and indicates understanding: Yes        Autumn Poe is a 23 year old, presenting for the following:  Physical        2/12/2024     2:57 PM   Additional Questions   Roomed by VE        Health Care Directive  Patient does not have a Health Care Directive or Living Will: Discussed advance care planning with patient; information given to patient to review.    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.  Some of her shifts are crazy when she is not able to eat or drink her entire shift.  She is very unhealthy in general but also triggers migraines.  Her altered sleep, she mainly works nights which also affects her sleep she is working on sleep hygiene.  She has tremendous stress at work and has been working with a counselor to help her manage  Since your last clinic visit, how have your headaches changed?  No change she has the same symptoms  How often are you getting headaches or migraines? 2 per month on average, usually 1 with her menses and at least 1 other  Are you able to do normal daily activities when you have a migraine? No  Are you taking rescue/relief medications? (Select all that apply) ibuprofen (Advil, Motrin), Tylenol, and sumatriptan (Imitrex)  How helpful is your rescue/relief medication?  I get some relief  Are you taking any medications to prevent migraines? (Select all that apply)  No  In the past 4 weeks, how often have you gone to urgent care or the emergency room because of your headaches?  0    Answers submitted by the patient for this visit:  Patient Health Questionnaire (Submitted on 2/11/2024)  If you checked off any problems, how  difficult have these problems made it for you to do your work, take care of things at home, or get along with other people?: Somewhat difficult  PHQ9 TOTAL SCORE: 12            2/10/2024   General Health   How would you rate your overall physical health? Good   Feel stress (tense, anxious, or unable to sleep) Very much   (!) STRESS CONCERN      2/10/2024   Nutrition   Three or more servings of calcium each day? Yes   Diet: Regular (no restrictions)   How many servings of fruit and vegetables per day? (!) 0-1   How many sweetened beverages each day? (!) 2         2/10/2024   Exercise   Days per week of moderate/strenous exercise 2 days   Average minutes spent exercising at this level 30 min   (!) EXERCISE CONCERN      2/10/2024   Social Factors   Frequency of gathering with friends or relatives Twice a week   Worry food won't last until get money to buy more No   Food not last or not have enough money for food? No   Do you have housing?  Yes   Are you worried about losing your housing? No   Lack of transportation? No   Unable to get utilities (heat,electricity)? No         2/10/2024   Dental   Dentist two times every year? (!) NO          No data to display                Today's PHQ-9 Score:       2/11/2024     9:40 PM   PHQ-9 SCORE   PHQ-9 Total Score MyChart 12 (Moderate depression)   PHQ-9 Total Score 12         2/10/2024   Substance Use   Alcohol more than 3/day or more than 7/wk No   Do you use any other substances recreationally? (!) ALCOHOL     Social History     Tobacco Use    Smoking status: Never     Passive exposure: Yes    Smokeless tobacco: Never    Tobacco comments:     no exposure   Vaping Use    Vaping Use: Never used   Substance Use Topics    Alcohol use: Yes    Drug use: No           2/10/2024   STI Screening   New sexual partner(s) since last STI/HIV test? No     History of abnormal Pap smear: NO - age 21-29 PAP every 3 years recommended        1/23/2023     2:11 PM   PAP / HPV   PAP Negative for  "Intraepithelial Lesion or Malignancy (NILM)            2/10/2024   Contraception/Family Planning   Questions about contraception or family planning No        Reviewed and updated as needed this visit by Provider                          Review of Systems  Constitutional, HEENT, cardiovascular, pulmonary, gi and gu systems are negative, except as otherwise noted.     Objective    Exam  /70 (BP Location: Left arm, Patient Position: Chair, Cuff Size: Adult Regular)   Pulse 74   Temp 98  F (36.7  C) (Temporal)   Resp 16   Ht 1.74 m (5' 8.5\")   Wt 77.1 kg (170 lb)   LMP 01/28/2024 (Approximate)   SpO2 99%   Breastfeeding No   BMI 25.47 kg/m     Estimated body mass index is 25.47 kg/m  as calculated from the following:    Height as of this encounter: 1.74 m (5' 8.5\").    Weight as of this encounter: 77.1 kg (170 lb).    Physical Exam  GENERAL: alert and no distress  EYES: Eyes grossly normal to inspection, PERRL and conjunctivae and sclerae normal  HENT: ear canals and TM's normal, nose and mouth without ulcers or lesions  NECK: no adenopathy, no asymmetry, masses, or scars  RESP: lungs clear to auscultation - no rales, rhonchi or wheezes  CV: regular rate and rhythm, normal S1 S2, no S3 or S4, no murmur, click or rub, no peripheral edema  ABDOMEN: soft, nontender, no hepatosplenomegaly, no masses and bowel sounds normal  MS: no gross musculoskeletal defects noted, no edema  SKIN: no suspicious lesions or rashes  NEURO: Normal strength and tone, mentation intact and speech normal  PSYCH: mentation appears normal, affect normal/bright      Signed Electronically by: Lena Madrid PA-C    "

## 2024-02-13 LAB — HCV AB SERPL QL IA: NONREACTIVE

## 2024-03-05 ENCOUNTER — MYC MEDICAL ADVICE (OUTPATIENT)
Dept: FAMILY MEDICINE | Facility: CLINIC | Age: 24
End: 2024-03-05
Payer: COMMERCIAL

## 2024-03-05 NOTE — LETTER
March 12, 2024      Deepika Wheeler  [address] [address] [place] [id number]              Dear Deepika,      We care about your health and well being so we have been reviewing your chart, we have uncovered that your most recent depression screening score needs to be updated.    Attached to this letter is the depression screening tool called the PHQ9.  Please fill this out at your earliest convenience so we can work with you to improve your overall health.     If you feel that you need to see us in person, please schedule a long/multi symptom appointment via mychart or by calling the clinic.       Thank you for this opportunity to continue to care for you and we look forward to hearing from you soon!     Your [facility] Care Team

## 2024-03-05 NOTE — TELEPHONE ENCOUNTER
Patient Quality Outreach    Patient is due for the following:   Depression  -  PHQ-9 needed      Topic Date Due    HPV Vaccine (3 - 3-dose series) 04/17/2023    COVID-19 Vaccine (4 - 2023-24 season) 09/01/2023     Chlamydia Screening    Next Steps:   Schedule a nurse only visit for vaccines  Patient was assigned appropriate questionnaire to complete    Type of outreach:    Sent [employer] message.  Send letter in 1 week if not read/completed    Questions for provider review:    None           Tricia Smith, [facility]  Chart routed to Care Team.

## 2024-06-26 NOTE — TELEPHONE ENCOUNTER
Patient's allergy vials were remixed.     Vial one contains [place]cam[place] (0.7mL), [place] (0.7mL), [place] (0.5mL), [place] (0.5mL), [place] (0.7mL), [place] (0.7mL) allergic extracts.      Vial two contains Kort Mix (0.2mL), [place] (0.7mL), Rabbit (0.5mL) allergic extracts.     Vial three contains Cat (2.5mL), Dog (0.8mL) allergic extracts.        All three vials were mixed to a 1:1 concentration.  The 1:1 concentration vials are due to  on 2025. The patient was charged a total of 15 doses on 2024 There are 5 doses per  1:1 vial.  There is a total of 5ml of serum per vial. The vials were visually inspected upon completion of mixing.  Vials were stored between 35-45 degrees Fahrenheit.  ERIN DESIR     Provider E-Visit time total (minutes): 2

## 2024-09-04 ENCOUNTER — MYC MEDICAL ADVICE (OUTPATIENT)
Dept: FAMILY MEDICINE | Facility: CLINIC | Age: 24
End: 2024-09-04
Payer: COMMERCIAL

## 2024-10-07 ENCOUNTER — OFFICE VISIT (OUTPATIENT)
Dept: FAMILY MEDICINE | Facility: CLINIC | Age: 24
End: 2024-10-07
Payer: COMMERCIAL

## 2024-10-07 VITALS
BODY MASS INDEX: 26.94 KG/M2 | RESPIRATION RATE: 18 BRPM | OXYGEN SATURATION: 99 % | TEMPERATURE: 97.7 F | WEIGHT: 181.9 LBS | DIASTOLIC BLOOD PRESSURE: 70 MMHG | HEIGHT: 69 IN | SYSTOLIC BLOOD PRESSURE: 126 MMHG | HEART RATE: 70 BPM

## 2024-10-07 DIAGNOSIS — L70.0 ACNE VULGARIS: Primary | ICD-10-CM

## 2024-10-07 DIAGNOSIS — R41.840 INATTENTION: ICD-10-CM

## 2024-10-07 DIAGNOSIS — Z23 NEED FOR VACCINATION: ICD-10-CM

## 2024-10-07 DIAGNOSIS — F32.0 MILD SINGLE CURRENT EPISODE OF MAJOR DEPRESSIVE DISORDER (H): ICD-10-CM

## 2024-10-07 PROCEDURE — 99214 OFFICE O/P EST MOD 30 MIN: CPT | Mod: 25 | Performed by: PHYSICIAN ASSISTANT

## 2024-10-07 PROCEDURE — 90471 IMMUNIZATION ADMIN: CPT | Performed by: PHYSICIAN ASSISTANT

## 2024-10-07 PROCEDURE — 90651 9VHPV VACCINE 2/3 DOSE IM: CPT | Performed by: PHYSICIAN ASSISTANT

## 2024-10-07 RX ORDER — TRETINOIN 0.25 MG/G
GEL TOPICAL AT BEDTIME
Qty: 45 G | Refills: 1 | Status: SHIPPED | OUTPATIENT
Start: 2024-10-07

## 2024-10-07 ASSESSMENT — PATIENT HEALTH QUESTIONNAIRE - PHQ9
SUM OF ALL RESPONSES TO PHQ QUESTIONS 1-9: 9
10. IF YOU CHECKED OFF ANY PROBLEMS, HOW DIFFICULT HAVE THESE PROBLEMS MADE IT FOR YOU TO DO YOUR WORK, TAKE CARE OF THINGS AT HOME, OR GET ALONG WITH OTHER PEOPLE: SOMEWHAT DIFFICULT
SUM OF ALL RESPONSES TO PHQ QUESTIONS 1-9: 9

## 2024-10-07 ASSESSMENT — PAIN SCALES - GENERAL: PAINLEVEL: NO PAIN (0)

## 2024-10-07 NOTE — PROGRESS NOTES
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  Could consider spironolactone of course but that is  not something we would like to use if she is going to become pregnant.  We will refer to Derm  - tretinoin (RETIN-A) 0.025 % external gel; Apply topically at bedtime.  - Adult Dermatology  Referral; Future    Inattention  Patient would like evaluation for potential ADD ADHD.  She does have anxiety and certainly there is a lot of overlap between potential ADHD and anxiety.  - Adult Mental Health  Referral; Future  She will be called to schedule    Mild single current episode of major depressive disorder (H)  She is doing therapy currently, she does not feel that she needs any medications at this time but will reconsider if symptoms are worsening        BMI  Estimated body mass index is 27.26 kg/m  as calculated from the following:    Height as of this encounter: 1.74 m (5' 8.5\").    Weight as of this encounter: 82.5 kg (181 lb 14.4 oz).   Weight management plan: Discussed healthy diet and exercise guidelines      This chart documentation was completed in part with Dragon voice recognition software.  Documentation is reviewed after completion, however, some words and grammatical errors may remain.  Lena Madrid PA-C      Autumn Poe is a 24 year old, presenting for the following health issues:  Derm Problem        10/7/2024     1:40 PM   Additional Questions   Roomed by Zakia LEYVA   Accompanied by None         10/7/2024     1:40 PM   Patient Reported Additional Medications   Patient reports taking the following new medications NA     History of Present Illness       Reason for visit:  " "Acne    She eats 0-1 servings of fruits and vegetables daily.She consumes 1 sweetened beverage(s) daily.She exercises with enough effort to increase her heart rate 10 to 19 minutes per day.  She exercises with enough effort to increase her heart rate 4 days per week. She is missing 2 dose(s) of medications per week.  She is not taking prescribed medications regularly due to remembering to take.         Concern - acne management, derm referral, and add testing  Onset: hx of acne, contraceptive used to manage though she is not taking contraceptive at this time  Description: seem hormonal, not on cycle right now but having flare ups, wouldn't like to use contraceptive manage  Intensity: mild, during cycle 8/10  Progression of Symptoms:  constant but she does have flares before her menses  Accompanying Signs & Symptoms: none  Previous history of similar problem: yes  Precipitating factors:        Worsened by: menstrual cycle, \"sweets\"  Alleviating factors:        Improved by: clindamycin in the past, clean skin routine and no make-up  Therapies tried and outcome: None  She has been on Differin and clindamycin both work to some extent they do not entirely prevent breakouts.  She has been on minocycline in the past but that did not seem to be as effective as she had hoped.  She has not seen dermatology for acne in the past.    She does see a therapist for her anxiety.  In discussion with her mom and her therapist she wonders if some of her anxiety symptoms may be more ADD ADHD.  She does have a family history of ADD.  Her mother, and aunt and cousin all have it.  Patient believes she is always struggled with that but she is learned ways to be high functioning.  She can get easily overwhelmed if there is multiple tasks to complete, she does not always complete all the tasks that she feels she should.  She feels that others do a better job of task completion than she does.  She can force herself to maintain attention and " "concentration when she must but it does take great effort.  She does have some depressive symptoms but feels that counseling is helpful enough for that at this time.           Review of Systems  Constitutional, HEENT, cardiovascular, pulmonary, gi and gu systems are negative, except as otherwise noted.      Objective    /70   Pulse 70   Temp 97.7  F (36.5  C) (Temporal)   Resp 18   Ht 1.74 m (5' 8.5\")   Wt 82.5 kg (181 lb 14.4 oz)   LMP 09/25/2024 (Approximate)   SpO2 99%   BMI 27.26 kg/m    Body mass index is 27.26 kg/m .  Physical Exam   GENERAL: alert and no distress  NECK: no adenopathy, no asymmetry, masses, or scars  RESP: lungs clear to auscultation - no rales, rhonchi or wheezes  CV: regular rate and rhythm, normal S1 S2, no S3 or S4, no murmur, click or rub, no peripheral edema  ABDOMEN: soft, nontender, no hepatosplenomegaly, no masses and bowel sounds normal  MS: no gross musculoskeletal defects noted, no edema  SKIN: Some erythematous papular eruption on her forehead and some postinflammatory erythematous lesions and scarring noted on her cheeks bilaterally    No results found for any visits on 10/07/24.        Signed Electronically by: Lena Madrid PA-C    "

## 2024-10-07 NOTE — PROGRESS NOTES
Prior to immunization administration, verified patients identity using patient s name and date of birth. Please see Immunization Activity for additional information.     Screening Questionnaire for Adult Immunization    Are you sick today?   No   Do you have allergies to medications, food, a vaccine component or latex?   No   Have you ever had a serious reaction after receiving a vaccination?   No   Do you have a long-term health problem with heart, lung, kidney, or metabolic disease (e.g., diabetes), asthma, a blood disorder, no spleen, complement component deficiency, a cochlear implant, or a spinal fluid leak?  Are you on long-term aspirin therapy?   No   Do you have cancer, leukemia, HIV/AIDS, or any other immune system problem?   No   Do you have a parent, brother, or sister with an immune system problem?   No   In the past 3 months, have you taken medications that affect  your immune system, such as prednisone, other steroids, or anticancer drugs; drugs for the treatment of rheumatoid arthritis, Crohn s disease, or psoriasis; or have you had radiation treatments?   No   Have you had a seizure, or a brain or other nervous system problem?   No   During the past year, have you received a transfusion of blood or blood    products, or been given immune (gamma) globulin or antiviral drug?   No   For women: Are you pregnant or is there a chance you could become       pregnant during the next month?   No   Have you received any vaccinations in the past 4 weeks?   No     Immunization questionnaire answers were all negative.      Patient instructed to remain in clinic for 15 minutes afterwards, and to report any adverse reactions.     Screening performed by Gini Collado MA on 10/7/2024 at 2:41 PM.

## 2024-10-16 ENCOUNTER — ANCILLARY PROCEDURE (OUTPATIENT)
Dept: GENERAL RADIOLOGY | Facility: CLINIC | Age: 24
End: 2024-10-16
Attending: PHYSICIAN ASSISTANT
Payer: COMMERCIAL

## 2024-10-16 ENCOUNTER — OFFICE VISIT (OUTPATIENT)
Dept: URGENT CARE | Facility: URGENT CARE | Age: 24
End: 2024-10-16
Payer: COMMERCIAL

## 2024-10-16 VITALS
TEMPERATURE: 98.2 F | DIASTOLIC BLOOD PRESSURE: 81 MMHG | WEIGHT: 182.3 LBS | OXYGEN SATURATION: 98 % | BODY MASS INDEX: 27.32 KG/M2 | HEART RATE: 77 BPM | SYSTOLIC BLOOD PRESSURE: 126 MMHG

## 2024-10-16 DIAGNOSIS — R05.1 ACUTE COUGH: ICD-10-CM

## 2024-10-16 DIAGNOSIS — J20.9 ACUTE BRONCHITIS, UNSPECIFIED ORGANISM: Primary | ICD-10-CM

## 2024-10-16 PROCEDURE — 71046 X-RAY EXAM CHEST 2 VIEWS: CPT | Mod: TC | Performed by: RADIOLOGY

## 2024-10-16 PROCEDURE — 99213 OFFICE O/P EST LOW 20 MIN: CPT | Performed by: PHYSICIAN ASSISTANT

## 2024-10-16 RX ORDER — DOXYCYCLINE HYCLATE 100 MG
100 TABLET ORAL 2 TIMES DAILY
Qty: 14 TABLET | Refills: 0 | Status: SHIPPED | OUTPATIENT
Start: 2024-10-16 | End: 2024-10-23

## 2024-10-16 NOTE — PATIENT INSTRUCTIONS
Your XR Looks OK per my read, if the radiologist seems something that I do not see, you will be contacted  Doxycycline as prescribed for bronchitis  Take Mucinex DM for cough   Fluids, rest and humidified air at night  Honey for cough

## 2024-10-16 NOTE — PROGRESS NOTES
Assessment & Plan     1. Acute bronchitis, unspecified organism  Patient with worsening productive cough for the past couple of weeks.  On exam she looks well.  Vital signs are stable.  Chest x-ray is negative per my read.  Suspect secondary bacterial infection.  Treatment with medication as below.  Supportive care is encouraged.  - XR Chest 2 Views; Future  - doxycycline hyclate (VIBRA-TABS) 100 MG tablet; Take 1 tablet (100 mg) by mouth 2 times daily for 7 days.  Dispense: 14 tablet; Refill: 0          Diagnosis and treatment plan was reviewed with patient and/or family.   We went over any labs or imaging. Discussed worsening symptoms or little to no relief despite treatment plan to follow-up with PCP or return to clinic.  Patient verbalizes understanding. All questions were addressed and answered.     [facility]    CHIEF COMPLAINT:   Chief Complaint   Patient presents with    Urgent Care     Pt came in with cough with phlegm and congestion X 2 weeks.     Auutmn Poe is a 24 year old female who presents to clinic today for evaluation of cough. Symptoms started about 2 weeks ago with cough. She has a strong productive cough.   No fever, chills, runny nose, congestion.   Cough has progressed. She has taken Mucinex, dayquil and nyquil.   No wheezing.       Past Medical History:   Diagnosis Date    Dermatophytosis of scalp and beard     Headache 07/28/2006    Headache     Headache     Mild single current episode of major depressive disorder (H) 03/26/2017    Uncomplicated asthma     Unspecified sinusitis (chronic)     Chronic sinusitis     Past Surgical History:   Procedure Laterality Date    [facility] CREATE EARDRUM OPENING,GEN ANESTH  2001    P.E. Tubes     Social History     Tobacco Use    Smoking status: Never     Passive exposure: Yes    Smokeless tobacco: Never    Tobacco comments:     no exposure   Substance Use Topics    Alcohol use: Yes     Current  Outpatient Medications   Medication Sig Dispense Refill    clindamycin (CLEOCIN-T) 1 % external gel Apply topically 2 times daily 60 g 11    doxycycline hyclate (VIBRA-TABS) 100 MG tablet Take 1 tablet (100 mg) by mouth 2 times daily for 7 days. 14 tablet 0    IBUPROFEN PO Reported on 4/24/2017      loratadine (CLARITIN) 10 MG tablet Take 10 mg by mouth daily      SUMAtriptan (IMITREX) 50 MG tablet TAKE ONE TABLET BY MOUTH AT ONSET OF HEADACHE FOR MIGRAINE. MAY REPEAT IN 2 HOURS IF NEEDED. MAXIMUM OF 2 TABLETS PER DAY 9 tablet 3    tretinoin (RETIN-A) 0.025 % external gel Apply topically at bedtime. 45 g 1    triamcinolone (NASACORT) 55 MCG/ACT nasal inhaler Spray 2 sprays into both nostrils 2 times daily Reported on 4/24/2017       No current facility-administered medications for this visit.     Allergies   Allergen Reactions    Blueberry Flavor Hives    Flavoring Agent Hives    Griseofulvin      rash    Penicillins      augmentin    Quinolones      floxin  rash       10 point ROS of systems were all negative except for pertinent positives noted in my HPI.      Exam:   /81   Pulse 77   Temp 98.2  F (36.8  C) (Tympanic)   Wt 82.7 kg (182 lb 4.8 oz)   LMP 09/25/2024 (Approximate)   SpO2 98%   BMI 27.32 kg/m    Constitutional: healthy, alert and no distress  Head: Normocephalic, atraumatic.  Eyes: conjunctiva clear, no drainage  ENT: TMs clear and shiny rajesh, nasal mucosa pink and moist, throat mildly erythematous without trismus or drooling.  Neck: neck is supple, no cervical lymphadenopathy or nuchal rigidity  Cardiovascular: RRR  Respiratory: CTA bilaterally, no rhonchi or rales  Gastrointestinal: soft and nontender  Skin: no rashes  Neurologic: Speech clear, gait normal. Moves all extremities.    Results for orders placed or performed in visit on 10/16/24   XR Chest 2 Views     Status: None    Narrative    EXAM: XR CHEST 2 VIEWS  LOCATION: [facility]  DATE: 10/16/2024    INDICATION:  [facility]  COMPARISON: None.      Impression    IMPRESSION: Negative chest.

## 2024-10-23 ASSESSMENT — PATIENT HEALTH QUESTIONNAIRE - PHQ9: SUM OF ALL RESPONSES TO PHQ QUESTIONS 1-9: 12

## 2024-10-28 ENCOUNTER — MYC MEDICAL ADVICE (OUTPATIENT)
Dept: FAMILY MEDICINE | Facility: CLINIC | Age: 24
End: 2024-10-28
Payer: COMMERCIAL

## 2024-12-16 ENCOUNTER — MYC MEDICAL ADVICE (OUTPATIENT)
Dept: FAMILY MEDICINE | Facility: CLINIC | Age: 24
End: 2024-12-16
Payer: COMMERCIAL

## 2024-12-16 DIAGNOSIS — Z13.29 SCREENING FOR THYROID DISORDER: Primary | ICD-10-CM

## 2024-12-18 ENCOUNTER — LAB (OUTPATIENT)
Dept: LAB | Facility: CLINIC | Age: 24
End: 2024-12-18
Payer: COMMERCIAL

## 2024-12-18 DIAGNOSIS — Z13.29 SCREENING FOR THYROID DISORDER: ICD-10-CM

## 2024-12-18 DIAGNOSIS — Z11.3 SCREENING FOR STDS (SEXUALLY TRANSMITTED DISEASES): Primary | ICD-10-CM

## 2024-12-18 LAB — TSH SERPL DL<=0.005 MIU/L-ACNC: 0.42 UIU/ML (ref 0.3–4.2)

## 2024-12-18 PROCEDURE — 84443 ASSAY THYROID STIM HORMONE: CPT

## 2024-12-18 PROCEDURE — 36415 COLL VENOUS BLD VENIPUNCTURE: CPT

## 2025-03-18 SDOH — HEALTH STABILITY: PHYSICAL HEALTH: ON AVERAGE, HOW MANY MINUTES DO YOU ENGAGE IN EXERCISE AT THIS LEVEL?: 30 MIN

## 2025-03-18 SDOH — HEALTH STABILITY: PHYSICAL HEALTH: ON AVERAGE, HOW MANY DAYS PER WEEK DO YOU ENGAGE IN MODERATE TO STRENUOUS EXERCISE (LIKE A BRISK WALK)?: 2 DAYS

## 2025-03-18 ASSESSMENT — ANXIETY QUESTIONNAIRES
4. TROUBLE RELAXING: SEVERAL DAYS
7. FEELING AFRAID AS IF SOMETHING AWFUL MIGHT HAPPEN: SEVERAL DAYS
8. IF YOU CHECKED OFF ANY PROBLEMS, HOW DIFFICULT HAVE THESE MADE IT FOR YOU TO DO YOUR WORK, TAKE CARE OF THINGS AT HOME, OR GET ALONG WITH OTHER PEOPLE?: SOMEWHAT DIFFICULT
3. WORRYING TOO MUCH ABOUT DIFFERENT THINGS: NEARLY EVERY DAY
2. NOT BEING ABLE TO STOP OR CONTROL WORRYING: MORE THAN HALF THE DAYS
GAD7 TOTAL SCORE: 13
1. FEELING NERVOUS, ANXIOUS, OR ON EDGE: MORE THAN HALF THE DAYS
IF YOU CHECKED OFF ANY PROBLEMS ON THIS QUESTIONNAIRE, HOW DIFFICULT HAVE THESE PROBLEMS MADE IT FOR YOU TO DO YOUR WORK, TAKE CARE OF THINGS AT HOME, OR GET ALONG WITH OTHER PEOPLE: SOMEWHAT DIFFICULT
6. BECOMING EASILY ANNOYED OR IRRITABLE: NEARLY EVERY DAY
GAD7 TOTAL SCORE: 13
5. BEING SO RESTLESS THAT IT IS HARD TO SIT STILL: SEVERAL DAYS
GAD7 TOTAL SCORE: 13
7. FEELING AFRAID AS IF SOMETHING AWFUL MIGHT HAPPEN: SEVERAL DAYS

## 2025-03-18 ASSESSMENT — PATIENT HEALTH QUESTIONNAIRE - PHQ9
SUM OF ALL RESPONSES TO PHQ QUESTIONS 1-9: 14
SUM OF ALL RESPONSES TO PHQ QUESTIONS 1-9: 14
10. IF YOU CHECKED OFF ANY PROBLEMS, HOW DIFFICULT HAVE THESE PROBLEMS MADE IT FOR YOU TO DO YOUR WORK, TAKE CARE OF THINGS AT HOME, OR GET ALONG WITH OTHER PEOPLE: SOMEWHAT DIFFICULT

## 2025-03-18 ASSESSMENT — SOCIAL DETERMINANTS OF HEALTH (SDOH): HOW OFTEN DO YOU GET TOGETHER WITH FRIENDS OR RELATIVES?: ONCE A WEEK

## 2025-03-19 ENCOUNTER — OFFICE VISIT (OUTPATIENT)
Dept: FAMILY MEDICINE | Facility: CLINIC | Age: 25
End: 2025-03-19
Payer: COMMERCIAL

## 2025-03-19 VITALS
HEART RATE: 70 BPM | BODY MASS INDEX: 27.85 KG/M2 | HEIGHT: 69 IN | SYSTOLIC BLOOD PRESSURE: 110 MMHG | RESPIRATION RATE: 16 BRPM | OXYGEN SATURATION: 98 % | DIASTOLIC BLOOD PRESSURE: 78 MMHG | WEIGHT: 188 LBS | TEMPERATURE: 98.1 F

## 2025-03-19 DIAGNOSIS — K21.00 GASTROESOPHAGEAL REFLUX DISEASE WITH ESOPHAGITIS WITHOUT HEMORRHAGE: ICD-10-CM

## 2025-03-19 DIAGNOSIS — Z30.011 ENCOUNTER FOR INITIAL PRESCRIPTION OF CONTRACEPTIVE PILLS: ICD-10-CM

## 2025-03-19 DIAGNOSIS — Z00.00 ROUTINE GENERAL MEDICAL EXAMINATION AT A HEALTH CARE FACILITY: Primary | ICD-10-CM

## 2025-03-19 DIAGNOSIS — R51.9 NONINTRACTABLE EPISODIC HEADACHE, UNSPECIFIED HEADACHE TYPE: ICD-10-CM

## 2025-03-19 RX ORDER — SUMATRIPTAN 50 MG/1
TABLET, FILM COATED ORAL
Qty: 9 TABLET | Refills: 3 | Status: SHIPPED | OUTPATIENT
Start: 2025-03-19

## 2025-03-19 RX ORDER — ACETAMINOPHEN AND CODEINE PHOSPHATE 120; 12 MG/5ML; MG/5ML
0.35 SOLUTION ORAL DAILY
Qty: 84 TABLET | Refills: 3 | Status: SHIPPED | OUTPATIENT
Start: 2025-03-19

## 2025-03-19 RX ORDER — OMEPRAZOLE 20 MG/1
20 CAPSULE, DELAYED RELEASE ORAL DAILY
Qty: 30 CAPSULE | Refills: 0 | Status: SHIPPED | OUTPATIENT
Start: 2025-03-19

## 2025-03-19 ASSESSMENT — PAIN SCALES - GENERAL: PAINLEVEL_OUTOF10: NO PAIN (0)

## 2025-03-19 NOTE — PROGRESS NOTES
Preventive Care Visit  [facility] YAMEL Madrid PA-C, Family Medicine  Mar 19, 2025      Assessment & Plan     Routine general medical examination at a health care facility  Recommend routine annual visits.  Cancer screening will be updated as below.  Declines chlamydia and gonorrhea screening Pap smear is due in 2026    Encounter for initial prescription of contraceptive pills  Will start progesterone only pill due to concerns with combined hormonal pills with her history of migraines though interesting she does report that her migraines improved when she is on an estrogen-containing pill, we discussed that estrogen progesterone pills does increase her risk of stroke, we will try progesterone only pill to see how she is doing.  She has plans to see dermatology in the next month may consider adding additional medications for better control if this does not improved her acne  - norethindrone (MICRONOR) 0.35 MG tablet; Take 1 tablet (0.35 mg) by mouth daily.    Nonintractable episodic headache, unspecified headache type  Stable, does not need sumatriptan refilled now but would like the medication to be available at the pharmacy  - SUMAtriptan (IMITREX) 50 MG tablet; TAKE ONE TABLET BY MOUTH AT ONSET OF HEADACHE FOR MIGRAINE. MAY REPEAT IN 2 HOURS IF NEEDED. MAXIMUM OF 2 TABLETS PER DAY    Gastroesophageal reflux disease with esophagitis without hemorrhage  She will avoid eating right before bed she will stay upright for 2 to 3 hours after she has eaten she will avoid tomato-based foods spicy foods fatty foods if symptoms are continuing beyond 30 days of omeprazole she will let me know  - omeprazole (PRILOSEC) 20 MG DR capsule; Take 1 capsule (20 mg) by mouth daily.    Patient has been advised of split billing requirements and indicates understanding: Yes        Counseling  Appropriate preventive services were addressed with this patient via screening, questionnaire, or discussion as appropriate  for fall prevention, nutrition, physical activity, Tobacco-use cessation, social engagement, weight loss and cognition.  Checklist reviewing preventive services available has been given to the patient.  Reviewed patient's diet, addressing concerns and/or questions.   She is at risk for lack of exercise and has been provided with information to increase physical activity for the benefit of her well-being.   The patient was instructed to see the dentist every 6 months.   She is at risk for psychosocial distress and has been provided with information to reduce risk.   The patient's PHQ-9 score is consistent with moderate depression. She was provided with information regarding depression.       This chart documentation was completed in part with Dragon voice recognition software.  Documentation is reviewed after completion, however, some words and grammatical errors may remain.  Lena Madrid PA-C      Autumn Poe is a 24 year old, presenting for the following:  Physical        3/19/2025     1:34 PM   Additional Questions   Roomed by bt   Accompanied by SELF          HPI  She has noticed some acid reflux symptoms it seems to occur if she lays down within an hour of eating.  Seems like a lot of different foods are causing the symptoms.  She has no blood in her stool.  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  She does eat and lay down quite frequently.  She wonders what she should try over-the-counter for this.       She is still having troubles with her acne.  It seems hormonal she does have an upcoming appointment with dermatology.  In the past she has noticed that estrogen-containing birth control pills really were not effective to reduce her acne but made her headaches better.  She does not have auras as much as she does have some transient vision changes with her headaches.    She is awaiting the finalized report from her ADHD evaluation this sadness is better anxiety can still be heightened at  times.  She is managing the death of her grandma and grandpa just 2 weeks apart better all the time.  She does not feel that she needs meds at this time but would consider ADHD meds depending on the results.    Advance Care Planning  Patient does not have a Health Care Directive: Discussed advance care planning with patient; information given to patient to review.      3/18/2025   General Health   How would you rate your overall physical health? (!) FAIR   Feel stress (tense, anxious, or unable to sleep) Rather much   (!) STRESS CONCERN      3/18/2025   Nutrition   Three or more servings of calcium each day? (!) NO   Diet: Regular (no restrictions)   How many servings of fruit and vegetables per day? (!) 0-1-goals 5-day   How many sweetened beverages each day? 0-1         3/18/2025   Exercise   Days per week of moderate/strenous exercise 2 days   Average minutes spent exercising at this level 30 min   (!) EXERCISE CONCERN      3/18/2025   Social Factors   Frequency of gathering with friends or relatives Once a week   Worry food won't last until get money to buy more No   Food not last or not have enough money for food? No   Do you have housing? (Housing is defined as stable permanent housing and does not include staying ouside in a car, in a tent, in an abandoned building, in an overnight shelter, or couch-surfing.) Yes   Are you worried about losing your housing? No   Lack of transportation? No   Unable to get utilities (heat,electricity)? No         3/18/2025   Dental   Dentist two times every year? (!) NO         Today's PHQ-9 Score:       3/18/2025     1:55 PM   PHQ-9 SCORE   PHQ-9 Total Score MyChart 14 (Moderate depression)   PHQ-9 Total Score 14        Patient-reported         3/18/2025   Substance Use   Alcohol more than 3/day or more than 7/wk No   Do you use any other substances recreationally? No     Social History     Tobacco Use    Smoking status: Never     Passive exposure: Yes    Smokeless tobacco:  Never    Tobacco comments:     no exposure   Vaping Use    Vaping status: Never Used   Substance Use Topics    Alcohol use: Yes    Drug use: No           3/18/2025   STI Screening   New sexual partner(s) since last STI/HIV test? No     History of abnormal Pap smear: No - age 21-29 PAP every 3 years recommended        1/23/2023     2:11 PM   PAP / HPV   PAP Negative for Intraepithelial Lesion or Malignancy (NILM)            3/18/2025   Contraception/Family Planning   Questions about contraception or family planning (!) YES wonders about starting birth control pills for contraception        Reviewed and updated as needed this visit by Provider     Meds                Lab work is in process  Labs reviewed in EPIC  BP Readings from Last 3 Encounters:   03/19/25 110/78   10/16/24 126/81   10/07/24 126/70    Wt Readings from Last 3 Encounters:   03/19/25 85.3 kg (188 lb)   10/16/24 82.7 kg (182 lb 4.8 oz)   10/07/24 82.5 kg (181 lb 14.4 oz)                  Patient Active Problem List   Diagnosis    Sinusitis, chronic    Allergic rhinitis    Headache    Acne    Adjustment disorder with mixed anxiety and depressed mood    Dysmenorrhea    CHEMO (generalized anxiety disorder)    Mild single current episode of major depressive disorder    Acne vulgaris     Past Surgical History:   Procedure Laterality Date    ZZHC CREATE EARDRUM OPENING,GEN ANESTH  2001    P.E. Tubes       Social History     Tobacco Use    Smoking status: Never     Passive exposure: Yes    Smokeless tobacco: Never    Tobacco comments:     no exposure   Substance Use Topics    Alcohol use: Yes     Family History   Problem Relation Age of Onset    Alcohol/Drug Maternal Grandfather         recovering alcoholic    Blood Disease Maternal Grandfather         Hodgkins    Cancer Maternal Grandfather         non-hodgkins lymphoma    Heart Disease Maternal Grandfather         arrythmias    Hypertension Maternal Grandfather     Lipids Maternal Grandfather     Coronary  Artery Disease Maternal Grandfather     Other Cancer Maternal Grandfather         Hodgkins    Hyperlipidemia Maternal Grandfather     Depression Maternal Grandfather     Substance Abuse Maternal Grandfather     Anesthesia Reaction Maternal Grandmother     Cancer Maternal Grandmother         Skin    Blood Disease Maternal Grandmother         Non-Hodgkins    Hypertension Maternal Grandmother     Lipids Maternal Grandmother     Obesity Maternal Grandmother     Cerebrovascular Disease Maternal Grandmother     Other Cancer Maternal Grandmother         non hodgkins/skin/CLL    Coronary Artery Disease Maternal Grandmother     Hyperlipidemia Maternal Grandmother     Depression Maternal Grandmother     Anxiety Disorder Maternal Grandmother     Heart Disease Brother         PDA & ASD    Congenital Anomalies Brother         Downs    Genetic Disorder Brother         down syndrome    Obesity Brother     Coronary Artery Disease Paternal Grandmother     Hypertension Paternal Grandmother     Osteoporosis Paternal Grandmother     Hyperlipidemia Paternal Grandmother     Coronary Artery Disease Paternal Grandfather     Hypertension Paternal Grandfather     Hyperlipidemia Paternal Grandfather     Substance Abuse Paternal Grandfather     Hypertension Mother     Depression Mother     Asthma Mother     Depression Father     Anxiety Disorder Father     Obesity Brother     Anxiety Disorder Brother          Current Outpatient Medications   Medication Sig Dispense Refill    clindamycin (CLEOCIN-T) 1 % external gel Apply topically 2 times daily 60 g 11    IBUPROFEN PO Reported on 4/24/2017      loratadine (CLARITIN) 10 MG tablet Take 10 mg by mouth daily      SUMAtriptan (IMITREX) 50 MG tablet TAKE ONE TABLET BY MOUTH AT ONSET OF HEADACHE FOR MIGRAINE. MAY REPEAT IN 2 HOURS IF NEEDED. MAXIMUM OF 2 TABLETS PER DAY 9 tablet 3    tretinoin (RETIN-A) 0.025 % external gel Apply topically at bedtime. 45 g 1    triamcinolone (NASACORT) 55 MCG/ACT  "nasal inhaler Spray 2 sprays into both nostrils 2 times daily Reported on 4/24/2017       Allergies   Allergen Reactions    Blueberry Flavoring Agent (Non-Screening) Hives    Flavoring Agent (Non-Screening) Hives    Griseofulvin      rash    Penicillins      augmentin    Quinolones      floxin  rash         Review of Systems  Constitutional, HEENT, cardiovascular, pulmonary, gi and gu systems are negative, except as otherwise noted.     Objective    Exam  /78   Pulse 70   Temp 98.1  F (36.7  C) (Temporal)   Resp 16   Ht 1.74 m (5' 8.5\")   Wt 85.3 kg (188 lb)   LMP 03/06/2025 (Exact Date)   SpO2 98%   BMI 28.17 kg/m     Estimated body mass index is 28.17 kg/m  as calculated from the following:    Height as of this encounter: 1.74 m (5' 8.5\").    Weight as of this encounter: 85.3 kg (188 lb).    Physical Exam  GENERAL: alert and no distress  EYES: Eyes grossly normal to inspection, PERRL and conjunctivae and sclerae normal  HENT: ear canals and TM's normal, nose and mouth without ulcers or lesions  NECK: no adenopathy, no asymmetry, masses, or scars  RESP: lungs clear to auscultation - no rales, rhonchi or wheezes  CV: regular rate and rhythm, normal S1 S2, no S3 or S4, no murmur, click or rub, no peripheral edema  ABDOMEN: soft, nontender, no hepatosplenomegaly, no masses and bowel sounds normal  MS: no gross musculoskeletal defects noted, no edema  SKIN: no suspicious lesions or rashes  NEURO: Normal strength and tone, mentation intact and speech normal  PSYCH: mentation appears normal, affect normal/bright        Signed Electronically by: Lena Madrid PA-C    Answers submitted by the patient for this visit:  Patient Health Questionnaire (Submitted on 3/18/2025)  If you checked off any problems, how difficult have these problems made it for you to do your work, take care of things at home, or get along with other people?: Somewhat difficult  PHQ9 TOTAL SCORE: 14  Patient Health Questionnaire (G7) " (Submitted on 3/18/2025)  CHEMO 7 TOTAL SCORE: 13

## 2025-03-19 NOTE — PATIENT INSTRUCTIONS
Patient Education   Preventive Care Advice   This is general advice given by our system to help you stay healthy. However, your care team may have specific advice just for you. Please talk to your care team about your preventive care needs.  Nutrition  Eat 5 or more servings of fruits and vegetables each day.  Try wheat bread, brown rice and whole grain pasta (instead of white bread, rice, and pasta).  Get enough calcium and vitamin D. Check the label on foods and aim for 100% of the RDA (recommended daily allowance).  Lifestyle  Exercise at least 150 minutes each week  (30 minutes a day, 5 days a week).  Do muscle strengthening activities 2 days a week. These help control your weight and prevent disease.  No smoking.  Wear sunscreen to prevent skin cancer.  Have a dental exam and cleaning every 6 months.  Yearly exams  See your health care team every year to talk about:  Any changes in your health.  Any medicines your care team has prescribed.  Preventive care, family planning, and ways to prevent chronic diseases.  Shots (vaccines)   HPV shots (up to age 26), if you've never had them before.  Hepatitis B shots (up to age 59), if you've never had them before.  COVID-19 shot: Get this shot when it's due.  Flu shot: Get a flu shot every year.  Tetanus shot: Get a tetanus shot every 10 years.  Pneumococcal, hepatitis A, and RSV shots: Ask your care team if you need these based on your risk.  Shingles shot (for age 50 and up)  General health tests  Diabetes screening:  Starting at age 35, Get screened for diabetes at least every 3 years.  If you are younger than age 35, ask your care team if you should be screened for diabetes.  Cholesterol test: At age 39, start having a cholesterol test every 5 years, or more often if advised.  Bone density scan (DEXA): At age 50, ask your care team if you should have this scan for osteoporosis (brittle bones).  Hepatitis C: Get tested at least once in your life.  STIs (sexually  transmitted infections)  Before age 24: Ask your care team if you should be screened for STIs.  After age 24: Get screened for STIs if you're at risk. You are at risk for STIs (including HIV) if:  You are sexually active with more than one person.  You don't use condoms every time.  You or a partner was diagnosed with a sexually transmitted infection.  If you are at risk for HIV, ask about PrEP medicine to prevent HIV.  Get tested for HIV at least once in your life, whether you are at risk for HIV or not.  Cancer screening tests  Cervical cancer screening: If you have a cervix, begin getting regular cervical cancer screening tests starting at age 21.  Breast cancer scan (mammogram): If you've ever had breasts, begin having regular mammograms starting at age 40. This is a scan to check for breast cancer.  Colon cancer screening: It is important to start screening for colon cancer at age 45.  Have a colonoscopy test every 10 years (or more often if you're at risk) Or, ask your provider about stool tests like a FIT test every year or Cologuard test every 3 years.  To learn more about your testing options, visit:   .  For help making a decision, visit:   https://bit.ly/zq72145.  Prostate cancer screening test: If you have a prostate, ask your care team if a prostate cancer screening test (PSA) at age 55 is right for you.  Lung cancer screening: If you are a current or former smoker ages 50 to 80, ask your care team if ongoing lung cancer screenings are right for you.  For informational purposes only. Not to replace the advice of your health care provider. Copyright   2023 [facility]. All rights reserved. Clinically reviewed by the [employer] [facility]. [employer] 488015 - REV 01/24.  Learning About Stress  What is stress?     Stress is your body's response to a hard situation. Your body can have a physical, emotional, or mental response. Stress is a fact of life for most people, and it  affects everyone differently. What causes stress for you may not be stressful for someone else.  A lot of things can cause stress. You may feel stress when you go on a job interview, take a test, or run a race. This kind of short-term stress is normal and even useful. It can help you if you need to work hard or react quickly. For example, stress can help you finish an important job on time.  Long-term stress is caused by ongoing stressful situations or events. Examples of long-term stress include long-term health problems, ongoing problems at work, or conflicts in your family. Long-term stress can harm your health.  How does stress affect your health?  When you are stressed, your body responds as though you are in danger. It makes hormones that speed up your heart, make you breathe faster, and give you a burst of energy. This is called the fight-or-flight stress response. If the stress is over quickly, your body goes back to normal and no harm is done.  But if stress happens too often or lasts too long, it can have bad effects. Long-term stress can make you more likely to get sick, and it can make symptoms of some diseases worse. If you tense up when you are stressed, you may develop neck, shoulder, or low back pain. Stress is linked to high blood pressure and heart disease.  Stress also harms your emotional health. It can make you ortiz, tense, or depressed. Your relationships may suffer, and you may not do well at work or school.  What can you do to manage stress?  You can try these things to help manage stress:   Do something active. Exercise or activity can help reduce stress. Walking is a great way to get started. Even everyday activities such as housecleaning or yard work can help.  Try yoga or melanie chi. These techniques combine exercise and meditation. You may need some training at first to learn them.  Do something you enjoy. For example, listen to music or go to a movie. Practice your hobby or do volunteer  "work.  Meditate. This can help you relax, because you are not worrying about what happened before or what may happen in the future.  Do guided imagery. Imagine yourself in any setting that helps you feel calm. You can use online videos, books, or a teacher to guide you.  Do breathing exercises. For example:  From a standing position, bend forward from the waist with your knees slightly bent. Let your arms dangle close to the floor.  Breathe in slowly and deeply as you return to a standing position. Roll up slowly and lift your head last.  Hold your breath for just a few seconds in the standing position.  Breathe out slowly and bend forward from the waist.  Let your feelings out. Talk, laugh, cry, and express anger when you need to. Talking with supportive friends or family, a counselor, or a wai leader about your feelings is a healthy way to relieve stress. Avoid discussing your feelings with people who make you feel worse.  Write. It may help to write about things that are bothering you. This helps you find out how much stress you feel and what is causing it. When you know this, you can find better ways to cope.  What can you do to prevent stress?  You might try some of these things to help prevent stress:  Manage your time. This helps you find time to do the things you want and need to do.  Get enough sleep. Your body recovers from the stresses of the day while you are sleeping.  Get support. Your family, friends, and community can make a difference in how you experience stress.  Limit your news feed. Avoid or limit time on social media or news that may make you feel stressed.  Do something active. Exercise or activity can help reduce stress. Walking is a great way to get started.  Where can you learn more?  Go to https://www.[facility].net/patiented  Enter N032 in the search box to learn more about \"Learning About Stress.\"  Current as of: October 24, 2024  Content Version: 14.4 2024-2025 [employer].   Care instructions adapted under license by your healthcare professional. If you have questions about a medical condition or this instruction, always ask your healthcare professional. [employer] disclaims any warranty or liability for your use of this information.    Learning About Depression Screening  What is depression screening?  Depression screening is a way to see if you have depression symptoms. It may be done by a doctor or counselor. It's often part of a routine checkup. That's because your mental health is just as important as your physical health.  Depression is a mental health condition that affects how you feel, think, and act. You may:  Have less energy.  Lose interest in your daily activities.  Feel sad and grouchy for a long time.  Depression is very common. It affects people of all ages.  Many things can lead to depression. Some people become depressed after they have a stroke or find out they have a major illness like cancer or heart disease. The death of a loved one or a breakup may lead to depression. It can run in families. Most experts believe that a combination of inherited genes and stressful life events can cause it.  What happens during screening?  You may be asked to fill out a form about your depression symptoms. You and the doctor will discuss your answers. The doctor may ask you more questions to learn more about how you think, act, and feel.  What happens after screening?  If you have symptoms of depression, your doctor will talk to you about your options.  Doctors usually treat depression with medicines or counseling. Often, combining the two works best. Many people don't get help because they think that they'll get over the depression on their own. But people with depression may not get better unless they get treatment.  The cause of depression is not well understood. There may be many factors involved. But if you have depression, it's not your fault.  A serious  "symptom of depression is thinking about death or suicide. If you or someone you care about talks about this or about feeling hopeless, get help right away.  It's important to know that depression can be treated. Medicine, counseling, and self-care may help.  Where can you learn more?  Go to https://www.[employer].net/patiented  Enter T185 in the search box to learn more about \"Learning About Depression Screening.\"  Current as of: July 31, 2024  Content Version: 14.4    4196-0218 [employer].   Care instructions adapted under license by your healthcare professional. If you have questions about a medical condition or this instruction, always ask your healthcare professional. [employer] disclaims any warranty or liability for your use of this information.       "

## 2025-04-10 ENCOUNTER — MYC MEDICAL ADVICE (OUTPATIENT)
Dept: DERMATOLOGY | Facility: CLINIC | Age: 25
End: 2025-04-10
Payer: COMMERCIAL

## 2025-04-10 NOTE — TELEPHONE ENCOUNTER
Talked with pt and rescheduled her to 3pm on May 15th. This is a reschedule from 4/18    Sonja Chung LPN on 4/10/2025 at 9:26 AM

## 2025-04-14 DIAGNOSIS — K21.00 GASTROESOPHAGEAL REFLUX DISEASE WITH ESOPHAGITIS WITHOUT HEMORRHAGE: ICD-10-CM

## 2025-04-14 RX ORDER — OMEPRAZOLE 20 MG/1
20 CAPSULE, DELAYED RELEASE ORAL DAILY
Qty: 90 CAPSULE | Refills: 2 | Status: SHIPPED | OUTPATIENT
Start: 2025-04-14

## 2025-05-08 NOTE — PATIENT INSTRUCTIONS
For acne:  Morning:  -Wash face with a benzoyl peroxide wash (4%) in the shower. Recommend Clear Pore by [employer]. Be sure to rinse completely off, because benzoyl peroxide may bleach towels.   -After washing face with a benzoyl peroxide wash, apply clindamycin 1% lotion to the face. This works synergistically with benzoyl peroxide  -Apply a noncomedogenic moisturizer compounded with an SPF of at least 30.    Night:  -Wash face with a gentle soap and lukewarm water.   -Apply pea sized about of tretinoin to face starting every other night, increasing to nightly as tolerated.   -Moisturize with a gentle facial moisturizer.    Topical Retinoids Info    What are topical retinoids?  These are medicines that are related to Vitamin A. They are used on the skin.  Retin-A , Renova , Differin , and Tazorac  are brand names.  Come in creams and clear gels  Used to treat skin conditions like pimples (acne), face wrinkling, or dark-colored sunspots    How do I use these medicines?  Wash face and let dry for 15 to 30 minutes.  Use a large pea-size amount of medicine to cover the whole face. Do not put on close to the eyes and lips. Rub in gently.   Start by using every other day. If you have no irritation after a few days, start to use it daily.   You might have too much irritation with daily use. Use it less often until the irritation goes away. Then try to increase slowly to daily use.   Irritation improves over time.  You may use moisturizer if your skin becomes dry. Look for  non-comedogenic  (non-pore plugging) and oil free products.     What are the side effects?  Dryness   Peeling and flaking   Irritation of the skin   Possible increased chance of sunburns. Protect your skin from sunlight. Wear a hat and use a sunscreen with SPF 30 or higher. Your sunscreen should have both UVA and UVB (broad-spectrum) protection.    Who should I call with questions?  [facility]: [phone]   [place]  [facility]: [phone]  For urgent needs outside of business hours call the [facility] at [phone] and ask for the dermatology resident on call

## 2025-05-08 NOTE — PROGRESS NOTES
[facility] Dermatology Note  Encounter Date: May 15, 2025  Office Visit     Reviewed patients past medical history and pertinent chart review prior to patients visit today.     Dermatology Problem List:    1. Acne vulgaris  -current: BPO wash, clindamycin lotion, tretinoin 0.025, spironolactone  -Previous Tx: Orthi-Tri-Cyclen      ____________________________________________    Assessment & Plan:     # Acne vulgaris    We reviewed the etiology, pathogenesis, evolutionary course and treatment alternatives.   At this time our plan is as follows:  -Discontinue over-the-counter anti-acne medications    Morning:  -Wash face with benzoyl peroxide wash (4%). Let lather and sit for 5 minutes prior to rinsing. Reviewed that benzoyl peroxide can bleach towels/sheets   -Apply clindamycin lotion to entire face  -Moisturize with a gentle facial moisturizer such as Olay complete for sensitive skin. Apply spf of 30 or more to entire face.    Night:  -Wash face with a gentle soap and lukewarm water.   -Apply pea sized about of tretinoin to face starting every other night, increasing to nightly as tolerated.   -Moisturize with a gentle facial moisturizer.    In addition:  -Topical treatments should be applied to the entire face and are not indicated for spot treatment.  -Patient should not get pregnant while on the above medications. She is on an oral progesterone only birth control due to migraines  - Spironolactone 50 mg daily was prescribed today. If after two weeks patient is tolerating 50mg well, she can increase to 100mg daily (2 tablets). Risks and side effects including but not limited to vomiting, diarrhea, dizziness, breast tenderness, spotting and feminization of a male fetus were reviewed.  -If serious side effects develop, patient should stop the medication(s) and contact prescribing provider.      Follow up: 3-4 month for follow-up      Harmony DOAN [facility]  Dermatology   "  _______________________________________    CC: Acne (Pt states, \" Here for a touch base on Acne, is using clindamycin gel and tretinoin, is on birth control currently, wondering about spironolactone, mostly acne on face, none on back \" )    HPI:  Ms. Deepika Wheeler is a(n) 24 year old female who presents today as a return patient for evaluation of acne.  The patient has been struggling with acne off and on since high school.  She notes a flare of her acne about 2 weeks before her period.  She is currently using clindamycin lotion twice daily and tretinoin nightly.  She has noticed a slight improvement with these but is looking for other treatment recommendations.  She is curious if spironolactone would be a good option for her or not.  She is currently sexually active but uses a progesterone only oral birth control.    Patient is otherwise feeling well, without additional skin concerns.      Physical Exam:  SKIN: Acne exam, which includes the face, neck, upper central chest, and upper central back was performed.  -  Involving the bilateral cheeks and chin are scattered erythematous papules/pustules, multiple nodulocystic lesions   - Involving the bilateral cheeks is acne scarring and pink macules consistent with post inflammatory hyperpigmentation               - No other lesions of concern on areas examined.     Medications:  Current Outpatient Medications   Medication Sig Dispense Refill    clindamycin (CLEOCIN-T) 1 % external gel Apply topically 2 times daily 60 g 11    IBUPROFEN PO Reported on 4/24/2017      loratadine (CLARITIN) 10 MG tablet Take 10 mg by mouth daily      norethindrone (MICRONOR) 0.35 MG tablet Take 1 tablet (0.35 mg) by mouth daily. 84 tablet 3    omeprazole (PRILOSEC) 20 MG DR capsule Take 1 capsule (20 mg) by mouth daily. 90 capsule 2    SUMAtriptan (IMITREX) 50 MG tablet TAKE ONE TABLET BY MOUTH AT ONSET OF HEADACHE FOR MIGRAINE. MAY REPEAT IN 2 HOURS IF NEEDED. MAXIMUM OF 2 TABLETS PER " DAY 9 tablet 3    tretinoin (RETIN-A) 0.025 % external gel Apply topically at bedtime. 45 g 1    triamcinolone (NASACORT) 55 MCG/ACT nasal inhaler Spray 2 sprays into both nostrils 2 times daily Reported on 4/24/2017       No current facility-administered medications for this visit.      Past Medical History:   Patient Active Problem List   Diagnosis    Sinusitis, chronic    Allergic rhinitis    Headache    Acne    Adjustment disorder with mixed anxiety and depressed mood    Dysmenorrhea    CHEMO (generalized anxiety disorder)    Mild single current episode of major depressive disorder    Acne vulgaris     Past Medical History:   Diagnosis Date    Dermatophytosis of scalp and beard     Headache 07/28/2006    Headache     Headache     Mild single current episode of major depressive disorder 03/26/2017    Uncomplicated asthma     Unspecified sinusitis (chronic)     Chronic sinusitis       CC Lena Madrid PA-C  [id number] [facility]  [place] [address] on close of this encounter.

## 2025-05-15 ENCOUNTER — OFFICE VISIT (OUTPATIENT)
Dept: DERMATOLOGY | Facility: CLINIC | Age: 25
End: 2025-05-15
Attending: PHYSICIAN ASSISTANT
Payer: COMMERCIAL

## 2025-05-15 VITALS — HEIGHT: 69 IN | WEIGHT: 188 LBS | BODY MASS INDEX: 27.85 KG/M2

## 2025-05-15 DIAGNOSIS — L70.0 ACNE VULGARIS: ICD-10-CM

## 2025-05-15 RX ORDER — SPIRONOLACTONE 50 MG/1
TABLET, FILM COATED ORAL
Qty: 180 TABLET | Refills: 3 | Status: SHIPPED | OUTPATIENT
Start: 2025-05-15

## 2025-05-15 ASSESSMENT — PAIN SCALES - GENERAL: PAINLEVEL_OUTOF10: NO PAIN (0)

## 2025-05-15 NOTE — LETTER
5/15/2025      Deepika Wheeler  [id number] [address] [place] [id number]      Dear Colleague,    Thank you for referring your patient, Deepika Wheeler, to the [facility]. Please see a copy of my visit note below.    [facility] Dermatology Note  Encounter Date: May 15, 2025  Office Visit     Reviewed patients past medical history and pertinent chart review prior to patients visit today.     Dermatology Problem List:    1. Acne vulgaris  -current: BPO wash, clindamycin lotion, tretinoin 0.025, spironolactone  -Previous Tx: Orthi-Tri-Cyclen      ____________________________________________    Assessment & Plan:     # Acne vulgaris    We reviewed the etiology, pathogenesis, evolutionary course and treatment alternatives.   At this time our plan is as follows:  -Discontinue over-the-counter anti-acne medications    Morning:  -Wash face with benzoyl peroxide wash (4%). Let lather and sit for 5 minutes prior to rinsing. Reviewed that benzoyl peroxide can bleach towels/sheets   -Apply clindamycin lotion to entire face  -Moisturize with a gentle facial moisturizer such as Olay complete for sensitive skin. Apply spf of 30 or more to entire face.    Night:  -Wash face with a gentle soap and lukewarm water.   -Apply pea sized about of tretinoin to face starting every other night, increasing to nightly as tolerated.   -Moisturize with a gentle facial moisturizer.    In addition:  -Topical treatments should be applied to the entire face and are not indicated for spot treatment.  -Patient should not get pregnant while on the above medications. She is on an oral progesterone only birth control due to migraines  - Spironolactone 50 mg daily was prescribed today. If after two weeks patient is tolerating 50mg well, she can increase to 100mg daily (2 tablets). Risks and side effects including but not limited to vomiting, diarrhea, dizziness, breast tenderness, spotting and feminization of a male  "fetus were reviewed.  -If serious side effects develop, patient should stop the medication(s) and contact prescribing provider.      Follow up: 3-4 month for follow-up      Harmony DOAN [facility]  Dermatology    _______________________________________    CC: Acne (Pt states, \" Here for a touch base on Acne, is using clindamycin gel and tretinoin, is on birth control currently, wondering about spironolactone, mostly acne on face, none on back \" )    HPI:  Ms. Deepika Wheeler is a(n) 24 year old female who presents today as a return patient for evaluation of acne.  The patient has been struggling with acne off and on since high school.  She notes a flare of her acne about 2 weeks before her period.  She is currently using clindamycin lotion twice daily and tretinoin nightly.  She has noticed a slight improvement with these but is looking for other treatment recommendations.  She is curious if spironolactone would be a good option for her or not.  She is currently sexually active but uses a progesterone only oral birth control.    Patient is otherwise feeling well, without additional skin concerns.      Physical Exam:  SKIN: Acne exam, which includes the face, neck, upper central chest, and upper central back was performed.  -  Involving the bilateral cheeks and chin are scattered erythematous papules/pustules, multiple nodulocystic lesions   - Involving the bilateral cheeks is acne scarring and pink macules consistent with post inflammatory hyperpigmentation               - No other lesions of concern on areas examined.     Medications:  Current Outpatient Medications   Medication Sig Dispense Refill     clindamycin (CLEOCIN-T) 1 % external gel Apply topically 2 times daily 60 g 11     IBUPROFEN PO Reported on 4/24/2017       loratadine (CLARITIN) 10 MG tablet Take 10 mg by mouth daily       norethindrone (MICRONOR) 0.35 MG tablet Take 1 tablet (0.35 mg) by mouth daily. 84 tablet 3     omeprazole " (PRILOSEC) 20 MG DR capsule Take 1 capsule (20 mg) by mouth daily. 90 capsule 2     SUMAtriptan (IMITREX) 50 MG tablet TAKE ONE TABLET BY MOUTH AT ONSET OF HEADACHE FOR MIGRAINE. MAY REPEAT IN 2 HOURS IF NEEDED. MAXIMUM OF 2 TABLETS PER DAY 9 tablet 3     tretinoin (RETIN-A) 0.025 % external gel Apply topically at bedtime. 45 g 1     triamcinolone (NASACORT) 55 MCG/ACT nasal inhaler Spray 2 sprays into both nostrils 2 times daily Reported on 4/24/2017       No current facility-administered medications for this visit.      Past Medical History:   Patient Active Problem List   Diagnosis     Sinusitis, chronic     Allergic rhinitis     Headache     Acne     Adjustment disorder with mixed anxiety and depressed mood     Dysmenorrhea     CHEMO (generalized anxiety disorder)     Mild single current episode of major depressive disorder     Acne vulgaris     Past Medical History:   Diagnosis Date     Dermatophytosis of scalp and beard      Headache 07/28/2006     Headache      Headache      Mild single current episode of major depressive disorder 03/26/2017     Uncomplicated asthma      Unspecified sinusitis (chronic)     Chronic sinusitis       CC Lena Madrid PA-C  [address] [place] [address] on close of this encounter.       Again, thank you for allowing me to participate in the care of your patient.        Sincerely,        Harmony Beauchamp PA-C    Electronically signed

## 2025-07-15 DIAGNOSIS — L70.0 ACNE VULGARIS: ICD-10-CM

## 2025-07-16 RX ORDER — CLINDAMYCIN PHOSPHATE 10 MG/G
GEL TOPICAL 2 TIMES DAILY
Qty: 60 G | Refills: 2 | Status: SHIPPED | OUTPATIENT
Start: 2025-07-16